# Patient Record
Sex: MALE | Race: WHITE | NOT HISPANIC OR LATINO
[De-identification: names, ages, dates, MRNs, and addresses within clinical notes are randomized per-mention and may not be internally consistent; named-entity substitution may affect disease eponyms.]

---

## 2017-02-28 ENCOUNTER — RX RENEWAL (OUTPATIENT)
Age: 67
End: 2017-02-28

## 2017-03-10 ENCOUNTER — APPOINTMENT (OUTPATIENT)
Dept: FAMILY MEDICINE | Facility: CLINIC | Age: 67
End: 2017-03-10

## 2017-03-10 VITALS
DIASTOLIC BLOOD PRESSURE: 70 MMHG | SYSTOLIC BLOOD PRESSURE: 120 MMHG | OXYGEN SATURATION: 98 % | HEART RATE: 60 BPM | BODY MASS INDEX: 31.84 KG/M2 | WEIGHT: 215 LBS | HEIGHT: 69 IN

## 2017-03-10 DIAGNOSIS — M54.5 LOW BACK PAIN: ICD-10-CM

## 2017-03-10 DIAGNOSIS — H04.203 UNSPECIFIED EPIPHORA, BILATERAL: ICD-10-CM

## 2017-03-10 DIAGNOSIS — G89.29 LOW BACK PAIN: ICD-10-CM

## 2017-09-12 ENCOUNTER — RX RENEWAL (OUTPATIENT)
Age: 67
End: 2017-09-12

## 2017-10-16 ENCOUNTER — APPOINTMENT (OUTPATIENT)
Dept: FAMILY MEDICINE | Facility: CLINIC | Age: 67
End: 2017-10-16
Payer: MEDICARE

## 2017-10-16 VITALS
WEIGHT: 215.25 LBS | BODY MASS INDEX: 31.88 KG/M2 | HEIGHT: 69 IN | SYSTOLIC BLOOD PRESSURE: 138 MMHG | DIASTOLIC BLOOD PRESSURE: 80 MMHG

## 2017-10-16 PROCEDURE — 99214 OFFICE O/P EST MOD 30 MIN: CPT

## 2017-12-01 ENCOUNTER — APPOINTMENT (OUTPATIENT)
Dept: FAMILY MEDICINE | Facility: CLINIC | Age: 67
End: 2017-12-01
Payer: MEDICARE

## 2017-12-01 VITALS
DIASTOLIC BLOOD PRESSURE: 62 MMHG | BODY MASS INDEX: 30.81 KG/M2 | OXYGEN SATURATION: 98 % | HEIGHT: 69 IN | TEMPERATURE: 98.6 F | SYSTOLIC BLOOD PRESSURE: 132 MMHG | WEIGHT: 208 LBS | HEART RATE: 68 BPM | RESPIRATION RATE: 16 BRPM

## 2017-12-01 PROCEDURE — 99213 OFFICE O/P EST LOW 20 MIN: CPT

## 2017-12-06 ENCOUNTER — RX RENEWAL (OUTPATIENT)
Age: 67
End: 2017-12-06

## 2018-06-19 ENCOUNTER — RX RENEWAL (OUTPATIENT)
Age: 68
End: 2018-06-19

## 2018-08-15 ENCOUNTER — RX RENEWAL (OUTPATIENT)
Age: 68
End: 2018-08-15

## 2018-09-17 ENCOUNTER — APPOINTMENT (OUTPATIENT)
Dept: FAMILY MEDICINE | Facility: CLINIC | Age: 68
End: 2018-09-17
Payer: MEDICARE

## 2018-09-17 VITALS
SYSTOLIC BLOOD PRESSURE: 122 MMHG | HEIGHT: 69 IN | BODY MASS INDEX: 31.4 KG/M2 | DIASTOLIC BLOOD PRESSURE: 70 MMHG | HEART RATE: 64 BPM | OXYGEN SATURATION: 96 % | TEMPERATURE: 98.3 F | WEIGHT: 212 LBS

## 2018-09-17 PROCEDURE — 99213 OFFICE O/P EST LOW 20 MIN: CPT

## 2018-09-18 ENCOUNTER — APPOINTMENT (OUTPATIENT)
Dept: FAMILY MEDICINE | Facility: CLINIC | Age: 68
End: 2018-09-18

## 2018-10-19 ENCOUNTER — APPOINTMENT (OUTPATIENT)
Dept: FAMILY MEDICINE | Facility: CLINIC | Age: 68
End: 2018-10-19
Payer: MEDICARE

## 2018-10-19 ENCOUNTER — NON-APPOINTMENT (OUTPATIENT)
Age: 68
End: 2018-10-19

## 2018-10-19 VITALS
BODY MASS INDEX: 30.4 KG/M2 | RESPIRATION RATE: 16 BRPM | OXYGEN SATURATION: 98 % | HEART RATE: 65 BPM | TEMPERATURE: 98.1 F | WEIGHT: 210 LBS | SYSTOLIC BLOOD PRESSURE: 120 MMHG | DIASTOLIC BLOOD PRESSURE: 70 MMHG | HEIGHT: 69.5 IN

## 2018-10-19 DIAGNOSIS — M70.20 OLECRANON BURSITIS, UNSPECIFIED ELBOW: ICD-10-CM

## 2018-10-19 PROCEDURE — 93000 ELECTROCARDIOGRAM COMPLETE: CPT | Mod: 59

## 2018-10-19 PROCEDURE — 36415 COLL VENOUS BLD VENIPUNCTURE: CPT

## 2018-10-19 PROCEDURE — G0438: CPT

## 2018-10-19 RX ORDER — FLUTICASONE PROPIONATE 50 UG/1
50 SPRAY, METERED NASAL DAILY
Qty: 16 | Refills: 0 | Status: DISCONTINUED | COMMUNITY
Start: 2017-12-01 | End: 2018-10-19

## 2018-10-19 NOTE — HEALTH RISK ASSESSMENT
[Very Good] : ~his/her~  mood as very good [] : No [No falls in past year] : Patient reported no falls in the past year [0] : 2) Feeling down, depressed, or hopeless: Not at all (0) [de-identified] : Cigars [de-identified] : Occasional [Patient reported colonoscopy was normal] : Patient reported colonoscopy was normal [Change in mental status noted] : No change in mental status noted [With Family] : lives with family [Fully functional (bathing, dressing, toileting, transferring, walking, feeding)] : Fully functional (bathing, dressing, toileting, transferring, walking, feeding) [Fully functional (using the telephone, shopping, preparing meals, housekeeping, doing laundry, using] : Fully functional and needs no help or supervision to perform IADLs (using the telephone, shopping, preparing meals, housekeeping, doing laundry, using transportation, managing medications and managing finances) [Seat Belt] :  uses seat belt [ColonoscopyDate] : 2015 [Aggressive treatment] : aggressive treatment

## 2018-10-19 NOTE — HISTORY OF PRESENT ILLNESS
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 No residual other than mild word finding difficulties.\par \par Small amounts of seroma remains right elbow. Patient will use Ace bandage.\par \par Needs followup colonoscopy.\par \par Stucco keratosis of hands and skin tag left flank reassured\par \par Status post prostate cancer , prostatectomy. No urinary difficulties

## 2018-10-19 NOTE — PHYSICAL EXAM
[No Acute Distress] : no acute distress [Well-Appearing] : well-appearing [Clear to Auscultation] : lungs were clear to auscultation bilaterally [Regular Rhythm] : with a regular rhythm [No Murmur] : no murmur heard [No Carotid Bruits] : no carotid bruits [Pedal Pulses Present] : the pedal pulses are present [No Edema] : there was no peripheral edema [Soft] : abdomen soft [Normal Anterior Cervical Nodes] : no anterior cervical lymphadenopathy [de-identified] : Pre-olecranon seroma right side

## 2018-10-21 LAB
ALBUMIN SERPL ELPH-MCNC: 4.6 G/DL
ALP BLD-CCNC: 77 U/L
ALT SERPL-CCNC: 21 U/L
ANION GAP SERPL CALC-SCNC: 13 MMOL/L
AST SERPL-CCNC: 19 U/L
BASOPHILS # BLD AUTO: 0.01 K/UL
BASOPHILS NFR BLD AUTO: 0.2 %
BILIRUB SERPL-MCNC: 0.4 MG/DL
BUN SERPL-MCNC: 20 MG/DL
CALCIUM SERPL-MCNC: 9.9 MG/DL
CHLORIDE SERPL-SCNC: 108 MMOL/L
CHOLEST SERPL-MCNC: 154 MG/DL
CHOLEST/HDLC SERPL: 2.6 RATIO
CO2 SERPL-SCNC: 26 MMOL/L
CREAT SERPL-MCNC: 0.88 MG/DL
EOSINOPHIL # BLD AUTO: 0.1 K/UL
EOSINOPHIL NFR BLD AUTO: 1.8 %
GLUCOSE SERPL-MCNC: 99 MG/DL
HBA1C MFR BLD HPLC: 5.6 %
HCT VFR BLD CALC: 45.3 %
HDLC SERPL-MCNC: 60 MG/DL
HGB BLD-MCNC: 15.3 G/DL
IMM GRANULOCYTES NFR BLD AUTO: 0.2 %
LDLC SERPL CALC-MCNC: 80 MG/DL
LYMPHOCYTES # BLD AUTO: 2.38 K/UL
LYMPHOCYTES NFR BLD AUTO: 44 %
MAN DIFF?: NORMAL
MCHC RBC-ENTMCNC: 30.2 PG
MCHC RBC-ENTMCNC: 33.8 GM/DL
MCV RBC AUTO: 89.3 FL
MONOCYTES # BLD AUTO: 0.37 K/UL
MONOCYTES NFR BLD AUTO: 6.8 %
NEUTROPHILS # BLD AUTO: 2.54 K/UL
NEUTROPHILS NFR BLD AUTO: 47 %
PLATELET # BLD AUTO: 174 K/UL
POTASSIUM SERPL-SCNC: 5.3 MMOL/L
PROT SERPL-MCNC: 7.3 G/DL
PSA SERPL-MCNC: 0.18 NG/ML
RBC # BLD: 5.07 M/UL
RBC # FLD: 14.1 %
SODIUM SERPL-SCNC: 147 MMOL/L
TRIGL SERPL-MCNC: 71 MG/DL
WBC # FLD AUTO: 5.41 K/UL

## 2018-11-13 ENCOUNTER — APPOINTMENT (OUTPATIENT)
Dept: FAMILY MEDICINE | Facility: CLINIC | Age: 68
End: 2018-11-13
Payer: MEDICARE

## 2018-11-13 VITALS
HEIGHT: 69.5 IN | HEART RATE: 68 BPM | SYSTOLIC BLOOD PRESSURE: 102 MMHG | WEIGHT: 220 LBS | OXYGEN SATURATION: 98 % | BODY MASS INDEX: 31.85 KG/M2 | DIASTOLIC BLOOD PRESSURE: 74 MMHG

## 2018-11-13 DIAGNOSIS — G89.29 DORSALGIA, UNSPECIFIED: ICD-10-CM

## 2018-11-13 DIAGNOSIS — M54.9 DORSALGIA, UNSPECIFIED: ICD-10-CM

## 2018-11-13 PROCEDURE — 99213 OFFICE O/P EST LOW 20 MIN: CPT

## 2019-01-23 ENCOUNTER — RX RENEWAL (OUTPATIENT)
Age: 69
End: 2019-01-23

## 2019-05-13 ENCOUNTER — RX RENEWAL (OUTPATIENT)
Age: 69
End: 2019-05-13

## 2019-06-26 ENCOUNTER — APPOINTMENT (OUTPATIENT)
Dept: FAMILY MEDICINE | Facility: CLINIC | Age: 69
End: 2019-06-26
Payer: MEDICARE

## 2019-06-26 VITALS
WEIGHT: 216 LBS | BODY MASS INDEX: 31.27 KG/M2 | HEIGHT: 69.5 IN | HEART RATE: 57 BPM | OXYGEN SATURATION: 97 % | DIASTOLIC BLOOD PRESSURE: 66 MMHG | SYSTOLIC BLOOD PRESSURE: 126 MMHG

## 2019-06-26 PROCEDURE — G0444 DEPRESSION SCREEN ANNUAL: CPT | Mod: 59

## 2019-06-26 PROCEDURE — 99214 OFFICE O/P EST MOD 30 MIN: CPT | Mod: 25

## 2019-06-26 NOTE — ASSESSMENT
[FreeTextEntry1] : Tiny eschar right lower leg no spreading erythema no rash elsewhere not ill will observe if rash spreads consider Doxy\par \par Referral to physical therapy for shoulder pain

## 2019-06-26 NOTE — HISTORY OF PRESENT ILLNESS
[FreeTextEntry8] : Slightly pruritic papule right lower leg noted approximately 1 week ago.  Small scab with tiny area of surrounding erythema.  Patient does not recall removing any ticks.  Not ill\par \par Chronic right shoulder pain request physical therapy.  Rotator cuff maneuvers negative.  Patient had left shoulder arthroscopy in the past

## 2019-07-19 DIAGNOSIS — M25.512 PAIN IN LEFT SHOULDER: ICD-10-CM

## 2019-08-10 ENCOUNTER — RX RENEWAL (OUTPATIENT)
Age: 69
End: 2019-08-10

## 2019-08-26 ENCOUNTER — APPOINTMENT (OUTPATIENT)
Dept: FAMILY MEDICINE | Facility: CLINIC | Age: 69
End: 2019-08-26
Payer: MEDICARE

## 2019-08-26 VITALS
DIASTOLIC BLOOD PRESSURE: 76 MMHG | BODY MASS INDEX: 31.27 KG/M2 | WEIGHT: 216 LBS | HEART RATE: 82 BPM | HEIGHT: 69.5 IN | OXYGEN SATURATION: 98 % | TEMPERATURE: 97.9 F | SYSTOLIC BLOOD PRESSURE: 118 MMHG

## 2019-08-26 PROCEDURE — 99213 OFFICE O/P EST LOW 20 MIN: CPT

## 2019-08-26 NOTE — HISTORY OF PRESENT ILLNESS
[FreeTextEntry8] : diffuse pruritic rash in woods also in a hottub states clean no fever otherwise well

## 2019-09-26 ENCOUNTER — RX RENEWAL (OUTPATIENT)
Age: 69
End: 2019-09-26

## 2019-12-24 ENCOUNTER — APPOINTMENT (OUTPATIENT)
Dept: FAMILY MEDICINE | Facility: CLINIC | Age: 69
End: 2019-12-24
Payer: MEDICARE

## 2019-12-24 ENCOUNTER — LABORATORY RESULT (OUTPATIENT)
Age: 69
End: 2019-12-24

## 2019-12-24 VITALS
HEIGHT: 69 IN | BODY MASS INDEX: 31.4 KG/M2 | HEART RATE: 73 BPM | WEIGHT: 212 LBS | RESPIRATION RATE: 16 BRPM | SYSTOLIC BLOOD PRESSURE: 124 MMHG | OXYGEN SATURATION: 97 % | DIASTOLIC BLOOD PRESSURE: 76 MMHG

## 2019-12-24 PROCEDURE — 99214 OFFICE O/P EST MOD 30 MIN: CPT

## 2019-12-30 LAB
25(OH)D3 SERPL-MCNC: 39.2 NG/ML
ALBUMIN SERPL ELPH-MCNC: 4.3 G/DL
ALP BLD-CCNC: 87 U/L
ALT SERPL-CCNC: 21 U/L
ANAPLASMA PHAGOCYTO IGM COMENT: NORMAL
ANAPLASMA PHAGOCYTO IGM COMMENT: NORMAL
ANAPLASMA PHAGOCYTOPHILIA IGG ANTIBODIES: NORMAL
ANAPLASMA PHAGOCYTOPHILIA IGM ANTIBODIES: NORMAL
ANION GAP SERPL CALC-SCNC: 14 MMOL/L
AST SERPL-CCNC: 18 U/L
B BURGDOR IGG+IGM SER QL IB: NORMAL
B MICROTI AB SER QL: NORMAL
BABESIA ANTIBODIES, IGG: NORMAL
BABESIA ANTIBODIES, IGM: NORMAL
BASOPHILS # BLD AUTO: 0.02 K/UL
BASOPHILS NFR BLD AUTO: 0.5 %
BILIRUB SERPL-MCNC: 0.5 MG/DL
BUN SERPL-MCNC: 13 MG/DL
CALCIUM SERPL-MCNC: 10 MG/DL
CHLORIDE SERPL-SCNC: 106 MMOL/L
CHOLEST SERPL-MCNC: 147 MG/DL
CHOLEST/HDLC SERPL: 2.5 RATIO
CO2 SERPL-SCNC: 24 MMOL/L
CREAT SERPL-MCNC: 0.91 MG/DL
EHRLICIA CHAFFEENIS IGG ANTIBODIES: NORMAL
EHRLICIA CHAFFEENIS IGG COMMENT: NORMAL
EHRLICIA CHAFFEENIS IGG INTERP: NORMAL
EHRLICIA CHAFFEENIS IGM ANTIBODIES: NORMAL
EOSINOPHIL # BLD AUTO: 0.09 K/UL
EOSINOPHIL NFR BLD AUTO: 2 %
ESTIMATED AVERAGE GLUCOSE: 111 MG/DL
GLUCOSE SERPL-MCNC: 112 MG/DL
HBA1C MFR BLD HPLC: 5.5 %
HCT VFR BLD CALC: 45.4 %
HDLC SERPL-MCNC: 59 MG/DL
HGB BLD-MCNC: 15.2 G/DL
IMM GRANULOCYTES NFR BLD AUTO: 0.2 %
LDLC SERPL CALC-MCNC: 73 MG/DL
LYMPHOCYTES # BLD AUTO: 1.73 K/UL
LYMPHOCYTES NFR BLD AUTO: 39 %
MAN DIFF?: NORMAL
MCHC RBC-ENTMCNC: 29.6 PG
MCHC RBC-ENTMCNC: 33.5 GM/DL
MCV RBC AUTO: 88.5 FL
MONOCYTES # BLD AUTO: 0.33 K/UL
MONOCYTES NFR BLD AUTO: 7.4 %
NEUTROPHILS # BLD AUTO: 2.26 K/UL
NEUTROPHILS NFR BLD AUTO: 50.9 %
PLATELET # BLD AUTO: 167 K/UL
POTASSIUM SERPL-SCNC: 5.2 MMOL/L
PROT SERPL-MCNC: 6.9 G/DL
RBC # BLD: 5.13 M/UL
RBC # FLD: 13.1 %
SODIUM SERPL-SCNC: 144 MMOL/L
T4 SERPL-MCNC: 9.3 UG/DL
TRIGL SERPL-MCNC: 76 MG/DL
TSH SERPL-ACNC: 0.94 UIU/ML
URATE SERPL-MCNC: 6.3 MG/DL
WBC # FLD AUTO: 4.44 K/UL

## 2020-01-22 ENCOUNTER — RX RENEWAL (OUTPATIENT)
Age: 70
End: 2020-01-22

## 2020-02-15 ENCOUNTER — RX RENEWAL (OUTPATIENT)
Age: 70
End: 2020-02-15

## 2020-08-03 ENCOUNTER — APPOINTMENT (OUTPATIENT)
Dept: FAMILY MEDICINE | Facility: CLINIC | Age: 70
End: 2020-08-03
Payer: MEDICARE

## 2020-08-03 VITALS
WEIGHT: 207 LBS | BODY MASS INDEX: 30.66 KG/M2 | HEIGHT: 69 IN | DIASTOLIC BLOOD PRESSURE: 74 MMHG | TEMPERATURE: 98.1 F | SYSTOLIC BLOOD PRESSURE: 120 MMHG | OXYGEN SATURATION: 96 % | RESPIRATION RATE: 16 BRPM | HEART RATE: 60 BPM

## 2020-08-03 PROCEDURE — 99214 OFFICE O/P EST MOD 30 MIN: CPT | Mod: 25

## 2020-08-03 PROCEDURE — 96372 THER/PROPH/DIAG INJ SC/IM: CPT

## 2020-08-03 RX ORDER — PREDNISONE 10 MG/1
10 TABLET ORAL DAILY
Qty: 30 | Refills: 0 | Status: DISCONTINUED | COMMUNITY
Start: 2019-08-26 | End: 2020-08-03

## 2020-08-03 RX ORDER — METHYLPRED ACET/NACL,ISO-OS/PF 40 MG/ML
40 VIAL (ML) INJECTION
Qty: 1 | Refills: 0 | Status: COMPLETED | OUTPATIENT
Start: 2020-08-03

## 2020-08-03 RX ORDER — METHYLPREDNISOLONE 4 MG/1
4 TABLET ORAL
Qty: 1 | Refills: 0 | Status: DISCONTINUED | COMMUNITY
Start: 2018-11-13 | End: 2020-08-03

## 2020-08-03 RX ORDER — HYDROCODONE BITARTRATE AND ACETAMINOPHEN 5; 325 MG/1; MG/1
5-325 TABLET ORAL
Qty: 20 | Refills: 0 | Status: DISCONTINUED | COMMUNITY
Start: 2018-11-13 | End: 2020-08-03

## 2020-08-03 RX ADMIN — METHYLPREDNISOLONE ACETATE 0 MG/ML: 40 INJECTION, SUSPENSION INTRA-ARTICULAR; INTRALESIONAL; INTRAMUSCULAR; SOFT TISSUE at 00:00

## 2020-08-03 NOTE — HISTORY OF PRESENT ILLNESS
[FreeTextEntry8] : Pruritic erythematous papules consistent with chigger bites for the past 4 days after clamming.  Patient is not ill.  No sign of infection.  Pruritus is intense

## 2020-09-10 ENCOUNTER — RX RENEWAL (OUTPATIENT)
Age: 70
End: 2020-09-10

## 2020-10-09 ENCOUNTER — RX RENEWAL (OUTPATIENT)
Age: 70
End: 2020-10-09

## 2020-10-29 ENCOUNTER — NON-APPOINTMENT (OUTPATIENT)
Age: 70
End: 2020-10-29

## 2020-10-29 ENCOUNTER — APPOINTMENT (OUTPATIENT)
Dept: FAMILY MEDICINE | Facility: CLINIC | Age: 70
End: 2020-10-29
Payer: MEDICARE

## 2020-10-29 ENCOUNTER — LABORATORY RESULT (OUTPATIENT)
Age: 70
End: 2020-10-29

## 2020-10-29 VITALS
TEMPERATURE: 97.8 F | HEIGHT: 69 IN | DIASTOLIC BLOOD PRESSURE: 64 MMHG | BODY MASS INDEX: 31.1 KG/M2 | SYSTOLIC BLOOD PRESSURE: 122 MMHG | WEIGHT: 210 LBS | HEART RATE: 62 BPM | OXYGEN SATURATION: 94 %

## 2020-10-29 DIAGNOSIS — H26.9 UNSPECIFIED CATARACT: ICD-10-CM
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
## 2020-10-29 DIAGNOSIS — Z87.39 PERSONAL HISTORY OF OTHER DISEASES OF THE MUSCULOSKELETAL SYSTEM AND CONNECTIVE TISSUE: ICD-10-CM

## 2020-10-29 DIAGNOSIS — R93.3 ABNORMAL FINDINGS ON DIAGNOSTIC IMAGING OF OTHER PARTS OF DIGESTIVE TRACT: ICD-10-CM

## 2020-10-29 DIAGNOSIS — Z87.2 PERSONAL HISTORY OF DISEASES OF THE SKIN AND SUBCUTANEOUS TISSUE: ICD-10-CM
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
## 2020-10-29 DIAGNOSIS — S70.361A INSECT BITE (NONVENOMOUS), RIGHT THIGH, INITIAL ENCOUNTER: ICD-10-CM

## 2020-10-29 DIAGNOSIS — R13.10 DYSPHAGIA, UNSPECIFIED: ICD-10-CM
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
## 2020-10-29 DIAGNOSIS — Z87.828 PERSONAL HISTORY OF OTHER (HEALED) PHYSICAL INJURY AND TRAUMA: ICD-10-CM

## 2020-10-29 DIAGNOSIS — S80.861A INSECT BITE (NONVENOMOUS), RIGHT LOWER LEG, INITIAL ENCOUNTER: ICD-10-CM

## 2020-10-29 DIAGNOSIS — J06.9 ACUTE UPPER RESPIRATORY INFECTION, UNSPECIFIED: ICD-10-CM

## 2020-10-29 PROCEDURE — 93000 ELECTROCARDIOGRAM COMPLETE: CPT | Mod: 59

## 2020-10-29 PROCEDURE — 99214 OFFICE O/P EST MOD 30 MIN: CPT | Mod: 25

## 2020-10-29 RX ORDER — PREDNISONE 10 MG/1
10 TABLET ORAL 3 TIMES DAILY
Qty: 15 | Refills: 1 | Status: DISCONTINUED | COMMUNITY
Start: 2020-08-03 | End: 2020-10-29

## 2020-10-29 NOTE — ASSESSMENT
[Patient Optimized for Surgery] : Patient optimized for surgery [FreeTextEntry4] : Patient is cleared for lens replacement for both eyes

## 2020-10-30 LAB
ALBUMIN SERPL ELPH-MCNC: 4.2 G/DL
ALP BLD-CCNC: 76 U/L
ALT SERPL-CCNC: 18 U/L
ANION GAP SERPL CALC-SCNC: 11 MMOL/L
AST SERPL-CCNC: 16 U/L
B BURGDOR IGG+IGM SER QL IB: NORMAL
BILIRUB SERPL-MCNC: 0.4 MG/DL
BUN SERPL-MCNC: 21 MG/DL
CALCIUM SERPL-MCNC: 9.2 MG/DL
CHLORIDE SERPL-SCNC: 103 MMOL/L
CO2 SERPL-SCNC: 25 MMOL/L
CREAT SERPL-MCNC: 0.88 MG/DL
GLUCOSE SERPL-MCNC: 105 MG/DL
POTASSIUM SERPL-SCNC: 4.6 MMOL/L
PROT SERPL-MCNC: 6.6 G/DL
PSA SERPL-MCNC: 0.34 NG/ML
SODIUM SERPL-SCNC: 140 MMOL/L

## 2020-11-09 ENCOUNTER — APPOINTMENT (OUTPATIENT)
Dept: FAMILY MEDICINE | Facility: CLINIC | Age: 70
End: 2020-11-09

## 2021-03-11 ENCOUNTER — RX RENEWAL (OUTPATIENT)
Age: 71
End: 2021-03-11

## 2021-08-12 ENCOUNTER — RX RENEWAL (OUTPATIENT)
Age: 71
End: 2021-08-12

## 2021-08-20 ENCOUNTER — APPOINTMENT (OUTPATIENT)
Dept: FAMILY MEDICINE | Facility: CLINIC | Age: 71
End: 2021-08-20
Payer: MEDICARE

## 2021-08-20 VITALS
HEIGHT: 69 IN | WEIGHT: 210 LBS | SYSTOLIC BLOOD PRESSURE: 100 MMHG | BODY MASS INDEX: 31.1 KG/M2 | OXYGEN SATURATION: 98 % | HEART RATE: 61 BPM | TEMPERATURE: 97.3 F | RESPIRATION RATE: 16 BRPM | DIASTOLIC BLOOD PRESSURE: 70 MMHG
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
## 2021-08-20 DIAGNOSIS — B88.0 OTHER ACARIASIS: ICD-10-CM

## 2021-08-20 PROCEDURE — 99213 OFFICE O/P EST LOW 20 MIN: CPT | Mod: 25

## 2021-08-20 PROCEDURE — 96372 THER/PROPH/DIAG INJ SC/IM: CPT

## 2021-08-20 RX ORDER — METHYLPRED ACET/NACL,ISO-OS/PF 40 MG/ML
40 VIAL (ML) INJECTION
Qty: 1 | Refills: 0 | Status: COMPLETED | OUTPATIENT
Start: 2021-08-20

## 2021-08-20 RX ADMIN — METHYLPREDNISOLONE ACETATE 0 MG/ML: 40 INJECTION, SUSPENSION INTRA-ARTICULAR; INTRALESIONAL; INTRAMUSCULAR; SOFT TISSUE at 00:00

## 2021-08-20 NOTE — PHYSICAL EXAM
[No Acute Distress] : no acute distress [Well Nourished] : well nourished [Well Developed] : well developed [No Respiratory Distress] : no respiratory distress  [No Accessory Muscle Use] : no accessory muscle use [Clear to Auscultation] : lungs were clear to auscultation bilaterally [Normal Rate] : normal rate  [Regular Rhythm] : with a regular rhythm [Normal S1, S2] : normal S1 and S2 [de-identified] : multiple 2 mm x 2 mm bug bites erythematous with clear exudates on b/l UEs, back, b/l ankle regions and feet

## 2021-08-20 NOTE — PLAN
[FreeTextEntry1] : Chigger bites\par - medrol inj in L arm, pt tolerated well\par - prednisone TID to start tomorrow for 3 days\par - triamcinolone cream PRN \par - advised to avoid itching and stay hydrated

## 2021-08-20 NOTE — REVIEW OF SYSTEMS
[Fever] : no fever [Chills] : no chills [Fatigue] : no fatigue [Pain] : no pain [Redness] : no redness [Chest Pain] : no chest pain [Palpitations] : no palpitations [Shortness Of Breath] : no shortness of breath [Wheezing] : no wheezing [Cough] : no cough [Abdominal Pain] : no abdominal pain [Nausea] : no nausea [Constipation] : no constipation [Vomiting] : no vomiting [de-identified] : multiple erythematous bites on b/l UEs, back, b/l LE ankle regions

## 2021-08-20 NOTE — HISTORY OF PRESENT ILLNESS
[FreeTextEntry8] : 72 YO M here for bug bites. Pt thinks he has chigger bites. Pt states he walks his dogs everyday on the roads. Pt states since day before yesterday he has very itchy, some are leaking fluid. Pt put on cortisone ream which helped a little. Benadryl did not help at all. Pt denies fever, chills, CP, SOB.

## 2021-09-01 ENCOUNTER — APPOINTMENT (OUTPATIENT)
Dept: FAMILY MEDICINE | Facility: CLINIC | Age: 71
End: 2021-09-01
Payer: MEDICARE

## 2021-09-01 VITALS
SYSTOLIC BLOOD PRESSURE: 118 MMHG | WEIGHT: 209 LBS | HEIGHT: 69 IN | BODY MASS INDEX: 30.96 KG/M2 | OXYGEN SATURATION: 97 % | DIASTOLIC BLOOD PRESSURE: 80 MMHG | TEMPERATURE: 97.3 F | HEART RATE: 58 BPM

## 2021-09-01 PROCEDURE — 99214 OFFICE O/P EST MOD 30 MIN: CPT

## 2021-09-01 RX ORDER — PREDNISONE 10 MG/1
10 TABLET ORAL 3 TIMES DAILY
Qty: 9 | Refills: 0 | Status: DISCONTINUED | COMMUNITY
Start: 2021-08-20 | End: 2021-09-01

## 2021-09-01 NOTE — HISTORY OF PRESENT ILLNESS
[FreeTextEntry1] : Here for med renewal\par \par  [de-identified] : Has no complaints exercises regularly no cardiovascular symptoms\par \par Prostate cancer in the distant past PSA has been normal\par \par Hypertension and hyperlipidemia controlled with current meds

## 2021-09-02 LAB
ALBUMIN SERPL ELPH-MCNC: 4.7 G/DL
ALP BLD-CCNC: 79 U/L
ALT SERPL-CCNC: 23 U/L
ANION GAP SERPL CALC-SCNC: 14 MMOL/L
AST SERPL-CCNC: 21 U/L
BILIRUB SERPL-MCNC: 0.4 MG/DL
BUN SERPL-MCNC: 22 MG/DL
CALCIUM SERPL-MCNC: 9.7 MG/DL
CHLORIDE SERPL-SCNC: 105 MMOL/L
CHOLEST SERPL-MCNC: 184 MG/DL
CO2 SERPL-SCNC: 24 MMOL/L
CREAT SERPL-MCNC: 0.82 MG/DL
ESTIMATED AVERAGE GLUCOSE: 120 MG/DL
GLUCOSE SERPL-MCNC: 91 MG/DL
HBA1C MFR BLD HPLC: 5.8 %
HDLC SERPL-MCNC: 62 MG/DL
LDLC SERPL CALC-MCNC: 96 MG/DL
NONHDLC SERPL-MCNC: 123 MG/DL
POTASSIUM SERPL-SCNC: 4.7 MMOL/L
PROT SERPL-MCNC: 7.2 G/DL
PSA SERPL-MCNC: 0.47 NG/ML
SODIUM SERPL-SCNC: 143 MMOL/L
TRIGL SERPL-MCNC: 133 MG/DL

## 2021-10-01 ENCOUNTER — NON-APPOINTMENT (OUTPATIENT)
Age: 71
End: 2021-10-01

## 2021-10-01 ENCOUNTER — APPOINTMENT (OUTPATIENT)
Dept: FAMILY MEDICINE | Facility: CLINIC | Age: 71
End: 2021-10-01
Payer: MEDICARE

## 2021-10-01 VITALS
OXYGEN SATURATION: 95 % | RESPIRATION RATE: 16 BRPM | DIASTOLIC BLOOD PRESSURE: 74 MMHG | BODY MASS INDEX: 31.99 KG/M2 | HEART RATE: 57 BPM | HEIGHT: 69 IN | TEMPERATURE: 96.6 F | SYSTOLIC BLOOD PRESSURE: 128 MMHG | WEIGHT: 216 LBS

## 2021-10-01 DIAGNOSIS — Z01.818 ENCOUNTER FOR OTHER PREPROCEDURAL EXAMINATION: ICD-10-CM

## 2021-10-01 DIAGNOSIS — Z12.11 ENCOUNTER FOR SCREENING FOR MALIGNANT NEOPLASM OF COLON: ICD-10-CM

## 2021-10-01 PROCEDURE — 99214 OFFICE O/P EST MOD 30 MIN: CPT | Mod: 25

## 2021-10-01 PROCEDURE — 93000 ELECTROCARDIOGRAM COMPLETE: CPT

## 2021-10-01 NOTE — HISTORY OF PRESENT ILLNESS
[No Pertinent Cardiac History] : no history of aortic stenosis, atrial fibrillation, coronary artery disease, recent myocardial infarction, or implantable device/pacemaker [No Pertinent Pulmonary History] : no history of asthma, COPD, sleep apnea, or smoking [Chronic Anticoagulation] : no chronic anticoagulation [Chronic Kidney Disease] : no chronic kidney disease [Diabetes] : diabetes [(Patient denies any chest pain, claudication, dyspnea on exertion, orthopnea, palpitations or syncope)] : Patient denies any chest pain, claudication, dyspnea on exertion, orthopnea, palpitations or syncope [FreeTextEntry1] : Follow-up colonoscopy [FreeTextEntry2] : To be determined [FreeTextEntry3] : Henry [FreeTextEntry4] : Patient having surveillance colonoscopy for strong family history of colon cancer\par \par He remains active without cardiovascular symptoms

## 2021-10-25 ENCOUNTER — APPOINTMENT (OUTPATIENT)
Dept: FAMILY MEDICINE | Facility: CLINIC | Age: 71
End: 2021-10-25
Payer: MEDICARE

## 2021-10-25 VITALS
HEIGHT: 69 IN | HEART RATE: 75 BPM | TEMPERATURE: 97.5 F | OXYGEN SATURATION: 97 % | BODY MASS INDEX: 32.14 KG/M2 | SYSTOLIC BLOOD PRESSURE: 140 MMHG | WEIGHT: 217 LBS | DIASTOLIC BLOOD PRESSURE: 80 MMHG | RESPIRATION RATE: 16 BRPM

## 2021-10-25 DIAGNOSIS — G47.62 SLEEP RELATED LEG CRAMPS: ICD-10-CM

## 2021-10-25 PROCEDURE — 99214 OFFICE O/P EST MOD 30 MIN: CPT

## 2021-10-25 NOTE — HISTORY OF PRESENT ILLNESS
[FreeTextEntry8] : Classic nocturnal leg cramps.  Cramps are relieved with banana and or tonic water.  Sent by specialist for electrolytes and magnesium level.  Denies claudication.  Patient also gets carpopedal spasm and has been taking potassium daily up until 1 week ago\par \par Physical exam pulses are excellent light touch normal vibratory sense normal 0 Achilles and 1+ patella deep tendon reflexes bilaterally\par \par Nocturnal leg cramps patient will use bananas and quinine check labs as requested

## 2021-10-27 LAB
ALBUMIN SERPL ELPH-MCNC: 4.4 G/DL
ANION GAP SERPL CALC-SCNC: 13 MMOL/L
BUN SERPL-MCNC: 22 MG/DL
CALCIUM SERPL-MCNC: 9.7 MG/DL
CHLORIDE SERPL-SCNC: 105 MMOL/L
CO2 SERPL-SCNC: 24 MMOL/L
CREAT SERPL-MCNC: 0.81 MG/DL
GLUCOSE SERPL-MCNC: 106 MG/DL
MAGNESIUM SERPL-MCNC: 2 MG/DL
PHOSPHATE SERPL-MCNC: 2.4 MG/DL
POTASSIUM SERPL-SCNC: 4.5 MMOL/L
SODIUM SERPL-SCNC: 142 MMOL/L
TSH SERPL-ACNC: 1.07 UIU/ML

## 2021-11-16 ENCOUNTER — APPOINTMENT (OUTPATIENT)
Dept: FAMILY MEDICINE | Facility: CLINIC | Age: 71
End: 2021-11-16
Payer: MEDICARE

## 2021-11-16 VITALS
SYSTOLIC BLOOD PRESSURE: 128 MMHG | WEIGHT: 213 LBS | HEART RATE: 67 BPM | BODY MASS INDEX: 31.55 KG/M2 | DIASTOLIC BLOOD PRESSURE: 80 MMHG | HEIGHT: 69 IN | TEMPERATURE: 97.4 F | OXYGEN SATURATION: 95 %

## 2021-11-16 DIAGNOSIS — M54.50 LOW BACK PAIN, UNSPECIFIED: ICD-10-CM

## 2021-11-16 PROCEDURE — 99213 OFFICE O/P EST LOW 20 MIN: CPT

## 2021-11-16 NOTE — PHYSICAL EXAM
[Normal] : no acute distress, well nourished, well developed and well-appearing [de-identified] : pain and decreased rom of ls spine

## 2021-11-16 NOTE — HISTORY OF PRESENT ILLNESS
[FreeTextEntry8] : exacerbation of lumbar pain radiates to right leg no specific mechanism of injury pt helpful in the past

## 2021-12-29 ENCOUNTER — RX RENEWAL (OUTPATIENT)
Age: 71
End: 2021-12-29

## 2022-04-28 ENCOUNTER — APPOINTMENT (OUTPATIENT)
Dept: FAMILY MEDICINE | Facility: CLINIC | Age: 72
End: 2022-04-28
Payer: MEDICARE

## 2022-04-28 VITALS
OXYGEN SATURATION: 95 % | DIASTOLIC BLOOD PRESSURE: 70 MMHG | HEIGHT: 69 IN | BODY MASS INDEX: 32.73 KG/M2 | SYSTOLIC BLOOD PRESSURE: 120 MMHG | HEART RATE: 65 BPM | WEIGHT: 221 LBS | TEMPERATURE: 97.3 F

## 2022-04-28 DIAGNOSIS — M54.31 SCIATICA, RIGHT SIDE: ICD-10-CM

## 2022-04-28 DIAGNOSIS — M79.651 PAIN IN RIGHT THIGH: ICD-10-CM

## 2022-04-28 PROCEDURE — 99214 OFFICE O/P EST MOD 30 MIN: CPT

## 2022-04-28 NOTE — HISTORY OF PRESENT ILLNESS
[FreeTextEntry8] : 2 days of intermittent positional right groin and anterior thigh pain.  2 days ago was very painful when he stepped out of bed.  Since then he has subsided.  Lifted weights today without problem.  History of low back pain\par \par No pain with flexion and internal rotation of hip deep tendon reflexes are 0-1+ symmetrical motor exam is normal\par \par Probable mild L3 sciatica.  Physical therapy ordered follow-up as needed

## 2022-05-31 ENCOUNTER — RX RENEWAL (OUTPATIENT)
Age: 72
End: 2022-05-31

## 2022-06-03 ENCOUNTER — APPOINTMENT (OUTPATIENT)
Dept: FAMILY MEDICINE | Facility: CLINIC | Age: 72
End: 2022-06-03

## 2022-06-20 ENCOUNTER — APPOINTMENT (OUTPATIENT)
Dept: FAMILY MEDICINE | Facility: CLINIC | Age: 72
End: 2022-06-20
Payer: MEDICARE

## 2022-06-20 ENCOUNTER — LABORATORY RESULT (OUTPATIENT)
Age: 72
End: 2022-06-20

## 2022-06-20 VITALS
DIASTOLIC BLOOD PRESSURE: 68 MMHG | WEIGHT: 216 LBS | HEART RATE: 65 BPM | OXYGEN SATURATION: 97 % | SYSTOLIC BLOOD PRESSURE: 123 MMHG | BODY MASS INDEX: 31.99 KG/M2 | HEIGHT: 69 IN | TEMPERATURE: 96.9 F

## 2022-06-20 PROCEDURE — 99213 OFFICE O/P EST LOW 20 MIN: CPT

## 2022-06-20 NOTE — PHYSICAL EXAM
[Normal] : normal sclera/conjunctiva, pupils are equal, round and reactive to light and extraocular movements are intact [de-identified] : swelling and erethema around site of tick removal

## 2022-06-20 NOTE — HISTORY OF PRESENT ILLNESS
[FreeTextEntry8] : tick bite of back 2 weeks ago area inflamed no fever or joint pain has chronic back pain

## 2022-09-09 ENCOUNTER — RX RENEWAL (OUTPATIENT)
Age: 72
End: 2022-09-09

## 2022-11-02 ENCOUNTER — OFFICE (OUTPATIENT)
Dept: URBAN - METROPOLITAN AREA CLINIC 12 | Facility: CLINIC | Age: 72
Setting detail: OPHTHALMOLOGY
End: 2022-11-02
Payer: MEDICARE

## 2022-11-02 DIAGNOSIS — Z20.822: ICD-10-CM

## 2022-11-02 DIAGNOSIS — Z01.812: ICD-10-CM

## 2022-11-02 DIAGNOSIS — H26.493: ICD-10-CM

## 2022-11-02 PROCEDURE — 99024 POSTOP FOLLOW-UP VISIT: CPT | Performed by: OPTOMETRIST

## 2022-11-02 PROCEDURE — 99211 OFF/OP EST MAY X REQ PHY/QHP: CPT | Performed by: OPHTHALMOLOGY

## 2022-11-02 ASSESSMENT — SPHEQUIV_DERIVED
OS_SPHEQUIV: 0.375
OD_SPHEQUIV: 0.375
OD_SPHEQUIV: 0.375
OS_SPHEQUIV: 0.375
OD_SPHEQUIV: 0.25
OD_SPHEQUIV: 0.25

## 2022-11-02 ASSESSMENT — REFRACTION_MANIFEST
OS_SPHERE: +0.50
OD_SPHERE: +0.75
OD_SPHERE: +0.75
OS_AXIS: 52
OD_CYLINDER: -0.75
OD_AXIS: 105
OS_CYLINDER: -0.25
OS_CYLINDER: -0.25
OS_SPHERE: +0.50
OD_AXIS: 105
OD_CYLINDER: -0.75
OS_AXIS: 52

## 2022-11-02 ASSESSMENT — KERATOMETRY
OD_AXISANGLE_DEGREES: 76
OS_K1POWER_DIOPTERS: 41.25
OS_K2POWER_DIOPTERS: 41.75
OD_K2POWER_DIOPTERS: 42.25
OS_K2POWER_DIOPTERS: 41.75
OD_K1POWER_DIOPTERS: 41.25
OD_K2POWER_DIOPTERS: 42.25
OS_AXISANGLE_DEGREES: 90
OS_K1POWER_DIOPTERS: 41.25
OS_AXISANGLE_DEGREES: 90
OD_AXISANGLE_DEGREES: 76
OD_K1POWER_DIOPTERS: 41.25

## 2022-11-02 ASSESSMENT — REFRACTION_AUTOREFRACTION
OD_SPHERE: +0.50
OS_CYLINDER: SPHERE
OS_AXIS: 0
OD_CYLINDER: -0.50
OD_AXIS: 133
OS_SPHERE: +0.25
OS_CYLINDER: SPHERE
OD_AXIS: 133
OS_AXIS: 0
OD_SPHERE: +0.50
OD_CYLINDER: -0.50
OS_SPHERE: +0.25

## 2022-11-02 ASSESSMENT — LID EXAM ASSESSMENTS
OS_BLEPHARITIS: 1+
OD_BLEPHARITIS: 1+

## 2022-11-02 ASSESSMENT — VISUAL ACUITY
OD_BCVA: 20/20
OS_BCVA: 20/50
OD_BCVA: 20/20
OS_BCVA: 20/50

## 2022-11-02 ASSESSMENT — AXIALLENGTH_DERIVED
OS_AL: 24.1955
OD_AL: 24.1502
OD_AL: 24.0993
OD_AL: 24.1502
OS_AL: 24.1955
OD_AL: 24.0993

## 2022-11-02 ASSESSMENT — CORNEAL TRAUMA - ABRASION: OD_ABRASION: ABSENT

## 2022-11-02 ASSESSMENT — CONFRONTATIONAL VISUAL FIELD TEST (CVF)
OS_FINDINGS: FULL
OD_FINDINGS: FULL

## 2022-11-02 ASSESSMENT — TONOMETRY
OS_IOP_MMHG: 15
OD_IOP_MMHG: 18

## 2022-11-02 ASSESSMENT — CORNEAL SURGICAL SCARRING: OD_SCARRING: STROMAL

## 2022-11-04 ENCOUNTER — RX ONLY (RX ONLY)
Age: 72
End: 2022-11-04

## 2022-11-04 ENCOUNTER — ASC (OUTPATIENT)
Dept: URBAN - METROPOLITAN AREA SURGERY 8 | Facility: SURGERY | Age: 72
Setting detail: OPHTHALMOLOGY
End: 2022-11-04
Payer: MEDICARE

## 2022-11-04 DIAGNOSIS — H26.492: ICD-10-CM

## 2022-11-04 PROCEDURE — 66821 AFTER CATARACT LASER SURGERY: CPT | Performed by: OPHTHALMOLOGY

## 2022-11-04 ASSESSMENT — AXIALLENGTH_DERIVED
OS_AL: 24.1955
OD_AL: 24.1502
OD_AL: 24.0993

## 2022-11-04 ASSESSMENT — REFRACTION_MANIFEST
OD_SPHERE: +0.75
OD_CYLINDER: -0.75
OD_AXIS: 105
OS_AXIS: 52
OS_SPHERE: +0.50
OS_CYLINDER: -0.25

## 2022-11-04 ASSESSMENT — CORNEAL SURGICAL SCARRING: OD_SCARRING: STROMAL

## 2022-11-04 ASSESSMENT — REFRACTION_AUTOREFRACTION
OD_SPHERE: +0.50
OS_SPHERE: +0.25
OD_AXIS: 133
OS_CYLINDER: SPHERE
OD_CYLINDER: -0.50
OS_AXIS: 0

## 2022-11-04 ASSESSMENT — KERATOMETRY
OD_K1POWER_DIOPTERS: 41.25
OS_K2POWER_DIOPTERS: 41.75
OS_K1POWER_DIOPTERS: 41.25
OS_AXISANGLE_DEGREES: 90
OD_AXISANGLE_DEGREES: 76
OD_K2POWER_DIOPTERS: 42.25

## 2022-11-04 ASSESSMENT — VISUAL ACUITY
OS_BCVA: 20/50
OD_BCVA: 20/20

## 2022-11-04 ASSESSMENT — CONFRONTATIONAL VISUAL FIELD TEST (CVF)
OD_FINDINGS: FULL
OS_FINDINGS: FULL

## 2022-11-04 ASSESSMENT — SPHEQUIV_DERIVED
OD_SPHEQUIV: 0.25
OS_SPHEQUIV: 0.375
OD_SPHEQUIV: 0.375

## 2022-11-04 ASSESSMENT — CORNEAL TRAUMA - ABRASION: OD_ABRASION: ABSENT

## 2022-11-10 ENCOUNTER — OFFICE (OUTPATIENT)
Dept: URBAN - METROPOLITAN AREA CLINIC 12 | Facility: CLINIC | Age: 72
Setting detail: OPHTHALMOLOGY
End: 2022-11-10
Payer: MEDICARE

## 2022-11-10 DIAGNOSIS — H26.492: ICD-10-CM

## 2022-11-10 PROCEDURE — 99024 POSTOP FOLLOW-UP VISIT: CPT | Performed by: OPTOMETRIST

## 2022-11-10 ASSESSMENT — REFRACTION_MANIFEST
OS_CYLINDER: -0.25
OD_AXIS: 105
OS_SPHERE: +0.50
OD_SPHERE: +0.75
OS_AXIS: 52
OD_CYLINDER: -0.75

## 2022-11-10 ASSESSMENT — KERATOMETRY
OS_K2POWER_DIOPTERS: 41.75
OD_K2POWER_DIOPTERS: 42.25
OS_K1POWER_DIOPTERS: 41.25
OD_K1POWER_DIOPTERS: 41.00
OS_AXISANGLE_DEGREES: 095
OD_AXISANGLE_DEGREES: 73

## 2022-11-10 ASSESSMENT — SPHEQUIV_DERIVED
OS_SPHEQUIV: 0.375
OS_SPHEQUIV: 0.125
OD_SPHEQUIV: 0.375
OD_SPHEQUIV: 0.5

## 2022-11-10 ASSESSMENT — CONFRONTATIONAL VISUAL FIELD TEST (CVF)
OD_FINDINGS: FULL
OS_FINDINGS: FULL

## 2022-11-10 ASSESSMENT — LID EXAM ASSESSMENTS
OD_BLEPHARITIS: 1+
OS_BLEPHARITIS: 1+

## 2022-11-10 ASSESSMENT — VISUAL ACUITY
OD_BCVA: 20/20-
OS_BCVA: 20/30-

## 2022-11-10 ASSESSMENT — REFRACTION_AUTOREFRACTION
OS_SPHERE: +0.25
OD_CYLINDER: -0.50
OD_AXIS: 133
OS_CYLINDER: -0.25
OS_AXIS: 063
OD_SPHERE: +0.75

## 2022-11-10 ASSESSMENT — AXIALLENGTH_DERIVED
OD_AL: 24.0964
OS_AL: 24.1955
OD_AL: 24.1473
OS_AL: 24.2984

## 2022-11-10 ASSESSMENT — TONOMETRY
OD_IOP_MMHG: 14
OS_IOP_MMHG: 16

## 2022-11-10 ASSESSMENT — CORNEAL SURGICAL SCARRING: OD_SCARRING: STROMAL

## 2022-11-30 ENCOUNTER — OFFICE (OUTPATIENT)
Dept: URBAN - METROPOLITAN AREA CLINIC 12 | Facility: CLINIC | Age: 72
Setting detail: OPHTHALMOLOGY
End: 2022-11-30
Payer: MEDICARE

## 2022-11-30 DIAGNOSIS — D31.30: ICD-10-CM

## 2022-11-30 DIAGNOSIS — H01.004: ICD-10-CM

## 2022-11-30 DIAGNOSIS — H35.373: ICD-10-CM

## 2022-11-30 DIAGNOSIS — H43.812: ICD-10-CM

## 2022-11-30 DIAGNOSIS — H01.001: ICD-10-CM

## 2022-11-30 DIAGNOSIS — H57.11: ICD-10-CM

## 2022-11-30 DIAGNOSIS — Z96.1: ICD-10-CM

## 2022-11-30 DIAGNOSIS — H35.62: ICD-10-CM

## 2022-11-30 DIAGNOSIS — H35.3131: ICD-10-CM

## 2022-11-30 DIAGNOSIS — H43.393: ICD-10-CM

## 2022-11-30 PROCEDURE — 92250 FUNDUS PHOTOGRAPHY W/I&R: CPT | Performed by: OPTOMETRIST

## 2022-11-30 PROCEDURE — 99213 OFFICE O/P EST LOW 20 MIN: CPT | Performed by: OPTOMETRIST

## 2022-11-30 ASSESSMENT — SPHEQUIV_DERIVED
OS_SPHEQUIV: 0.125
OD_SPHEQUIV: 0.375
OD_SPHEQUIV: 0.625
OS_SPHEQUIV: 0.375

## 2022-11-30 ASSESSMENT — REFRACTION_MANIFEST
OD_SPHERE: +0.75
OS_AXIS: 52
OS_CYLINDER: -0.25
OS_SPHERE: +0.50
OD_CYLINDER: -0.75
OD_AXIS: 105

## 2022-11-30 ASSESSMENT — LID EXAM ASSESSMENTS
OD_COMMENTS: LID EVERTED
OD_COMMENTS: NO FB FOUND
OD_BLEPHARITIS: 1+
OS_BLEPHARITIS: 1+

## 2022-11-30 ASSESSMENT — CONFRONTATIONAL VISUAL FIELD TEST (CVF)
OS_FINDINGS: FULL
OD_FINDINGS: FULL

## 2022-11-30 ASSESSMENT — TONOMETRY
OD_IOP_MMHG: 13
OD_IOP_MMHG: 13
OS_IOP_MMHG: 17

## 2022-11-30 ASSESSMENT — REFRACTION_AUTOREFRACTION
OS_SPHERE: +0.25
OD_SPHERE: +1.00
OD_AXIS: 129
OD_CYLINDER: -0.75
OS_CYLINDER: -0.25
OS_AXIS: 033

## 2022-11-30 ASSESSMENT — CORNEAL SURGICAL SCARRING: OD_SCARRING: STROMAL

## 2022-11-30 ASSESSMENT — VISUAL ACUITY
OS_BCVA: 20/25-2
OD_BCVA: 20/20-

## 2022-12-16 ENCOUNTER — APPOINTMENT (OUTPATIENT)
Dept: FAMILY MEDICINE | Facility: CLINIC | Age: 72
End: 2022-12-16

## 2022-12-16 VITALS
WEIGHT: 220 LBS | BODY MASS INDEX: 32.58 KG/M2 | TEMPERATURE: 97.8 F | OXYGEN SATURATION: 95 % | HEART RATE: 67 BPM | DIASTOLIC BLOOD PRESSURE: 82 MMHG | SYSTOLIC BLOOD PRESSURE: 135 MMHG | HEIGHT: 69 IN

## 2022-12-16 DIAGNOSIS — N52.9 MALE ERECTILE DYSFUNCTION, UNSPECIFIED: ICD-10-CM

## 2022-12-16 DIAGNOSIS — H35.30 UNSPECIFIED MACULAR DEGENERATION: ICD-10-CM

## 2022-12-16 DIAGNOSIS — Z00.00 ENCOUNTER FOR GENERAL ADULT MEDICAL EXAMINATION W/OUT ABNORMAL FINDINGS: ICD-10-CM

## 2022-12-16 PROCEDURE — 99214 OFFICE O/P EST MOD 30 MIN: CPT

## 2022-12-16 RX ORDER — TRIAMCINOLONE ACETONIDE 1 MG/G
0.1 CREAM TOPICAL TWICE DAILY
Qty: 80 | Refills: 0 | Status: DISCONTINUED | COMMUNITY
Start: 2021-08-20 | End: 2022-12-16

## 2022-12-16 RX ORDER — DOXYCYCLINE HYCLATE 100 MG/1
100 TABLET ORAL
Qty: 42 | Refills: 0 | Status: DISCONTINUED | COMMUNITY
Start: 2022-06-20 | End: 2022-12-16

## 2022-12-16 RX ORDER — PREDNISONE 10 MG/1
10 TABLET ORAL DAILY
Qty: 30 | Refills: 0 | Status: DISCONTINUED | COMMUNITY
Start: 2021-11-16 | End: 2022-12-16

## 2022-12-16 NOTE — PHYSICAL EXAM
[No JVD] : no jugular venous distention [Thyroid Normal, No Nodules] : the thyroid was normal and there were no nodules present [No Carotid Bruits] : no carotid bruits [Pedal Pulses Present] : the pedal pulses are present [No Edema] : there was no peripheral edema [Normal Supraclavicular Nodes] : no supraclavicular lymphadenopathy [Normal Posterior Cervical Nodes] : no posterior cervical lymphadenopathy [Normal Anterior Cervical Nodes] : no anterior cervical lymphadenopathy [Normal] : no joint swelling and grossly normal strength and tone

## 2022-12-16 NOTE — HISTORY OF PRESENT ILLNESS
[FreeTextEntry1] : Checkup [de-identified] : Patient has no complaints.  There is minimal if any right arm weakness post CVA.  Followed by ophthalmology has mild macular degeneration.  Requests Viagra for erectile dysfunction\par \par Hypertension well-controlled on amlodipine and lisinopril\par \par Hyperlipidemia on simvastatin post CVA was hemorrhagic\par \par Status post prostate cancer no urinary difficulties but having erectile dysfunction we will prescribe Viagra

## 2022-12-17 LAB
ALBUMIN SERPL ELPH-MCNC: 4.5 G/DL
ALP BLD-CCNC: 105 U/L
ALT SERPL-CCNC: 25 U/L
ANION GAP SERPL CALC-SCNC: 10 MMOL/L
AST SERPL-CCNC: 21 U/L
BILIRUB SERPL-MCNC: 0.4 MG/DL
BUN SERPL-MCNC: 23 MG/DL
CALCIUM SERPL-MCNC: 9.8 MG/DL
CHLORIDE SERPL-SCNC: 106 MMOL/L
CHOLEST SERPL-MCNC: 144 MG/DL
CO2 SERPL-SCNC: 27 MMOL/L
CREAT SERPL-MCNC: 0.83 MG/DL
EGFR: 92 ML/MIN/1.73M2
ESTIMATED AVERAGE GLUCOSE: 120 MG/DL
GLUCOSE SERPL-MCNC: 162 MG/DL
HBA1C MFR BLD HPLC: 5.8 %
HDLC SERPL-MCNC: 54 MG/DL
LDLC SERPL CALC-MCNC: 76 MG/DL
NONHDLC SERPL-MCNC: 90 MG/DL
POTASSIUM SERPL-SCNC: 4.6 MMOL/L
PROT SERPL-MCNC: 6.8 G/DL
PSA SERPL-MCNC: 0.55 NG/ML
SODIUM SERPL-SCNC: 143 MMOL/L
TRIGL SERPL-MCNC: 72 MG/DL

## 2022-12-30 ENCOUNTER — OFFICE (OUTPATIENT)
Dept: URBAN - METROPOLITAN AREA CLINIC 12 | Facility: CLINIC | Age: 72
Setting detail: OPHTHALMOLOGY
End: 2022-12-30
Payer: MEDICARE

## 2022-12-30 DIAGNOSIS — H01.001: ICD-10-CM

## 2022-12-30 DIAGNOSIS — Z96.1: ICD-10-CM

## 2022-12-30 DIAGNOSIS — H16.223: ICD-10-CM

## 2022-12-30 DIAGNOSIS — H35.3131: ICD-10-CM

## 2022-12-30 DIAGNOSIS — H57.11: ICD-10-CM

## 2022-12-30 DIAGNOSIS — D31.30: ICD-10-CM

## 2022-12-30 DIAGNOSIS — H43.812: ICD-10-CM

## 2022-12-30 DIAGNOSIS — H01.004: ICD-10-CM

## 2022-12-30 DIAGNOSIS — H43.393: ICD-10-CM

## 2022-12-30 DIAGNOSIS — H35.62: ICD-10-CM

## 2022-12-30 DIAGNOSIS — H35.373: ICD-10-CM

## 2022-12-30 PROCEDURE — 99213 OFFICE O/P EST LOW 20 MIN: CPT | Performed by: STUDENT IN AN ORGANIZED HEALTH CARE EDUCATION/TRAINING PROGRAM

## 2022-12-30 ASSESSMENT — LID EXAM ASSESSMENTS
OS_BLEPHARITIS: 1+
OD_COMMENTS: LID EVERTED
OD_BLEPHARITIS: 1+
OD_COMMENTS: NO FB FOUND

## 2022-12-30 ASSESSMENT — TONOMETRY
OD_IOP_MMHG: 14
OS_IOP_MMHG: 16

## 2022-12-30 ASSESSMENT — CORNEAL SURGICAL SCARRING: OD_SCARRING: STROMAL

## 2022-12-30 ASSESSMENT — SUPERFICIAL PUNCTATE KERATITIS (SPK)
OS_SPK: T
OD_SPK: T

## 2022-12-30 ASSESSMENT — CONFRONTATIONAL VISUAL FIELD TEST (CVF)
OS_FINDINGS: FULL
OD_FINDINGS: FULL

## 2022-12-31 ASSESSMENT — REFRACTION_AUTOREFRACTION
OD_AXIS: 144
OS_SPHERE: +0.25
OD_SPHERE: +0.75
OS_CYLINDER: SPHERE
OS_AXIS: 000
OD_CYLINDER: -0.50

## 2022-12-31 ASSESSMENT — SPHEQUIV_DERIVED
OD_SPHEQUIV: 0.5
OD_SPHEQUIV: 0.5

## 2022-12-31 ASSESSMENT — VISUAL ACUITY
OD_BCVA: 20/20
OS_BCVA: 20/40

## 2022-12-31 ASSESSMENT — REFRACTION_MANIFEST
OD_AXIS: 145
OD_CYLINDER: -0.50
OS_SPHERE: +0.25
OS_AXIS: 000
OD_SPHERE: +0.75
OS_CYLINDER: SPHERE

## 2023-03-21 ENCOUNTER — OFFICE (OUTPATIENT)
Dept: URBAN - METROPOLITAN AREA CLINIC 12 | Facility: CLINIC | Age: 73
Setting detail: OPHTHALMOLOGY
End: 2023-03-21
Payer: MEDICARE

## 2023-03-21 DIAGNOSIS — H16.223: ICD-10-CM

## 2023-03-21 DIAGNOSIS — H01.004: ICD-10-CM

## 2023-03-21 DIAGNOSIS — H57.11: ICD-10-CM

## 2023-03-21 DIAGNOSIS — H01.001: ICD-10-CM

## 2023-03-21 PROCEDURE — 92012 INTRM OPH EXAM EST PATIENT: CPT | Performed by: STUDENT IN AN ORGANIZED HEALTH CARE EDUCATION/TRAINING PROGRAM

## 2023-03-21 ASSESSMENT — REFRACTION_MANIFEST
OS_SPHERE: +0.25
OD_AXIS: 145
OD_CYLINDER: -0.50
OS_AXIS: 000
OS_CYLINDER: SPHERE
OD_SPHERE: +0.75

## 2023-03-21 ASSESSMENT — KERATOMETRY
OD_AXISANGLE_DEGREES: 60
OD_K1POWER_DIOPTERS: 41.25
OD_K2POWER_DIOPTERS: 42.00
OS_AXISANGLE_DEGREES: 84
OS_K1POWER_DIOPTERS: 41.50
OS_K2POWER_DIOPTERS: 41.75
METHOD_AUTO_MANUAL: AUTO

## 2023-03-21 ASSESSMENT — REFRACTION_AUTOREFRACTION
OD_SPHERE: +0.75
OD_CYLINDER: -0.75
OS_SPHERE: +0.75
OS_CYLINDER: -0.25
OD_AXIS: 128
OS_AXIS: 86

## 2023-03-21 ASSESSMENT — LID EXAM ASSESSMENTS
OD_BLEPHARITIS: 1+
OS_BLEPHARITIS: 1+
OD_COMMENTS: LID EVERTED
OD_COMMENTS: NO FB FOUND

## 2023-03-21 ASSESSMENT — CONFRONTATIONAL VISUAL FIELD TEST (CVF)
OS_FINDINGS: FULL
OD_FINDINGS: FULL

## 2023-03-21 ASSESSMENT — SPHEQUIV_DERIVED
OD_SPHEQUIV: 0.5
OD_SPHEQUIV: 0.375
OS_SPHEQUIV: 0.625

## 2023-03-21 ASSESSMENT — AXIALLENGTH_DERIVED
OD_AL: 24.1473
OD_AL: 24.0964
OS_AL: 24.0457

## 2023-03-21 ASSESSMENT — VISUAL ACUITY
OD_BCVA: 20/20-2
OS_BCVA: 20/30-1

## 2023-03-21 ASSESSMENT — CORNEAL SURGICAL SCARRING: OD_SCARRING: STROMAL

## 2023-03-21 ASSESSMENT — TONOMETRY
OS_IOP_MMHG: 17
OD_IOP_MMHG: 16

## 2023-03-21 ASSESSMENT — SUPERFICIAL PUNCTATE KERATITIS (SPK)
OS_SPK: T
OD_SPK: T

## 2023-03-29 ENCOUNTER — OFFICE (OUTPATIENT)
Dept: URBAN - METROPOLITAN AREA CLINIC 12 | Facility: CLINIC | Age: 73
Setting detail: OPHTHALMOLOGY
End: 2023-03-29
Payer: MEDICARE

## 2023-03-29 DIAGNOSIS — H35.3131: ICD-10-CM

## 2023-03-29 DIAGNOSIS — H57.11: ICD-10-CM

## 2023-03-29 DIAGNOSIS — H01.001: ICD-10-CM

## 2023-03-29 DIAGNOSIS — H01.004: ICD-10-CM

## 2023-03-29 DIAGNOSIS — H16.223: ICD-10-CM

## 2023-03-29 PROCEDURE — 99213 OFFICE O/P EST LOW 20 MIN: CPT | Performed by: OPTOMETRIST

## 2023-03-29 ASSESSMENT — SPHEQUIV_DERIVED
OD_SPHEQUIV: 0.5
OS_SPHEQUIV: 0.375
OD_SPHEQUIV: 0.5

## 2023-03-29 ASSESSMENT — REFRACTION_MANIFEST
OD_CYLINDER: -0.50
OS_AXIS: 000
OS_SPHERE: +0.25
OS_CYLINDER: SPHERE
OD_AXIS: 145
OD_SPHERE: +0.75

## 2023-03-29 ASSESSMENT — LID EXAM ASSESSMENTS
OS_BLEPHARITIS: 1+
OD_BLEPHARITIS: 1+

## 2023-03-29 ASSESSMENT — REFRACTION_AUTOREFRACTION
OD_SPHERE: +0.75
OS_SPHERE: +0.50
OS_AXIS: 050
OD_AXIS: 129
OD_CYLINDER: -0.50
OS_CYLINDER: -0.25

## 2023-03-29 ASSESSMENT — KERATOMETRY
OS_AXISANGLE_DEGREES: 083
OS_K2POWER_DIOPTERS: 41.75
OD_K1POWER_DIOPTERS: 41.25
OD_K2POWER_DIOPTERS: 42.00
OS_K1POWER_DIOPTERS: 41.00
OD_AXISANGLE_DEGREES: 072
METHOD_AUTO_MANUAL: AUTO

## 2023-03-29 ASSESSMENT — AXIALLENGTH_DERIVED
OD_AL: 24.0964
OS_AL: 24.2439
OD_AL: 24.0964

## 2023-03-29 ASSESSMENT — VISUAL ACUITY
OS_BCVA: 20/25-1
OD_BCVA: 20/20-2

## 2023-03-29 ASSESSMENT — TONOMETRY
OS_IOP_MMHG: 15
OD_IOP_MMHG: 15

## 2023-03-29 ASSESSMENT — CORNEAL SURGICAL SCARRING: OD_SCARRING: STROMAL

## 2023-03-29 ASSESSMENT — CONFRONTATIONAL VISUAL FIELD TEST (CVF)
OS_FINDINGS: FULL
OD_FINDINGS: FULL

## 2023-03-29 ASSESSMENT — SUPERFICIAL PUNCTATE KERATITIS (SPK)
OD_SPK: T
OS_SPK: T

## 2023-04-22 ENCOUNTER — RX RENEWAL (OUTPATIENT)
Age: 73
End: 2023-04-22

## 2023-05-12 ENCOUNTER — RX RENEWAL (OUTPATIENT)
Age: 73
End: 2023-05-12

## 2023-05-22 ENCOUNTER — OFFICE (OUTPATIENT)
Dept: URBAN - METROPOLITAN AREA CLINIC 12 | Facility: CLINIC | Age: 73
Setting detail: OPHTHALMOLOGY
End: 2023-05-22
Payer: MEDICARE

## 2023-05-22 ENCOUNTER — RX ONLY (RX ONLY)
Age: 73
End: 2023-05-22

## 2023-05-22 DIAGNOSIS — H01.001: ICD-10-CM

## 2023-05-22 DIAGNOSIS — H57.11: ICD-10-CM

## 2023-05-22 DIAGNOSIS — H01.004: ICD-10-CM

## 2023-05-22 DIAGNOSIS — H16.223: ICD-10-CM

## 2023-05-22 PROCEDURE — 99213 OFFICE O/P EST LOW 20 MIN: CPT | Performed by: STUDENT IN AN ORGANIZED HEALTH CARE EDUCATION/TRAINING PROGRAM

## 2023-05-22 ASSESSMENT — REFRACTION_AUTOREFRACTION
OD_AXIS: 134
OS_SPHERE: +0.75
OS_AXIS: 060
OS_CYLINDER: -0.25
OD_CYLINDER: -0.50
OD_SPHERE: +0.75

## 2023-05-22 ASSESSMENT — REFRACTION_MANIFEST
OD_AXIS: 145
OS_AXIS: 000
OD_SPHERE: +0.75
OS_SPHERE: +0.25
OS_CYLINDER: SPHERE
OD_CYLINDER: -0.50

## 2023-05-22 ASSESSMENT — TEAR BREAK UP TIME (TBUT)
OD_TBUT: 2 SEC
OS_TBUT: 2 SEC

## 2023-05-22 ASSESSMENT — KERATOMETRY
OD_K1POWER_DIOPTERS: 41.25
OS_K2POWER_DIOPTERS: 41.75
METHOD_AUTO_MANUAL: AUTO
OS_AXISANGLE_DEGREES: 089
OS_K1POWER_DIOPTERS: 41.25
OD_AXISANGLE_DEGREES: 069
OD_K2POWER_DIOPTERS: 42.25

## 2023-05-22 ASSESSMENT — SPHEQUIV_DERIVED
OD_SPHEQUIV: 0.5
OD_SPHEQUIV: 0.5
OS_SPHEQUIV: 0.625

## 2023-05-22 ASSESSMENT — SUPERFICIAL PUNCTATE KERATITIS (SPK)
OS_SPK: T
OD_SPK: T

## 2023-05-22 ASSESSMENT — AXIALLENGTH_DERIVED
OD_AL: 24.0486
OD_AL: 24.0486
OS_AL: 24.0935

## 2023-05-22 ASSESSMENT — CORNEAL SURGICAL SCARRING: OD_SCARRING: STROMAL

## 2023-05-22 ASSESSMENT — LID EXAM ASSESSMENTS
OD_BLEPHARITIS: 1+
OS_BLEPHARITIS: 1+

## 2023-05-22 ASSESSMENT — VISUAL ACUITY
OD_BCVA: 20/20
OS_BCVA: 20/25-2

## 2023-05-22 ASSESSMENT — TONOMETRY
OD_IOP_MMHG: 12
OS_IOP_MMHG: 13

## 2023-05-22 ASSESSMENT — CONFRONTATIONAL VISUAL FIELD TEST (CVF)
OD_FINDINGS: FULL
OS_FINDINGS: FULL

## 2023-05-31 ENCOUNTER — OFFICE (OUTPATIENT)
Dept: URBAN - METROPOLITAN AREA CLINIC 88 | Facility: CLINIC | Age: 73
Setting detail: OPHTHALMOLOGY
End: 2023-05-31
Payer: MEDICARE

## 2023-05-31 DIAGNOSIS — H43.812: ICD-10-CM

## 2023-05-31 DIAGNOSIS — D31.30: ICD-10-CM

## 2023-05-31 DIAGNOSIS — H35.373: ICD-10-CM

## 2023-05-31 DIAGNOSIS — H35.3131: ICD-10-CM

## 2023-05-31 DIAGNOSIS — H43.393: ICD-10-CM

## 2023-05-31 PROCEDURE — 99213 OFFICE O/P EST LOW 20 MIN: CPT | Performed by: SPECIALIST

## 2023-05-31 PROCEDURE — 92134 CPTRZ OPH DX IMG PST SGM RTA: CPT | Performed by: SPECIALIST

## 2023-05-31 ASSESSMENT — VISUAL ACUITY
OD_BCVA: 20/20-1
OS_BCVA: 20/25

## 2023-05-31 ASSESSMENT — SUPERFICIAL PUNCTATE KERATITIS (SPK)
OD_SPK: T
OS_SPK: T

## 2023-05-31 ASSESSMENT — TONOMETRY
OD_IOP_MMHG: 15
OS_IOP_MMHG: 13

## 2023-05-31 ASSESSMENT — LID EXAM ASSESSMENTS
OS_BLEPHARITIS: 1+
OD_BLEPHARITIS: 1+

## 2023-05-31 ASSESSMENT — REFRACTION_AUTOREFRACTION
OS_SPHERE: +0.75
OS_CYLINDER: -0.25
OD_CYLINDER: -0.50
OD_AXIS: 134
OS_AXIS: 060
OD_SPHERE: +0.75

## 2023-05-31 ASSESSMENT — SPHEQUIV_DERIVED
OS_SPHEQUIV: 0.625
OD_SPHEQUIV: 0.5

## 2023-05-31 ASSESSMENT — TEAR BREAK UP TIME (TBUT)
OD_TBUT: 2 SEC
OS_TBUT: 2 SEC

## 2023-05-31 ASSESSMENT — CONFRONTATIONAL VISUAL FIELD TEST (CVF)
OD_FINDINGS: FULL
OS_FINDINGS: FULL

## 2023-05-31 ASSESSMENT — CORNEAL SURGICAL SCARRING: OD_SCARRING: STROMAL

## 2023-06-19 ENCOUNTER — RX RENEWAL (OUTPATIENT)
Age: 73
End: 2023-06-19

## 2023-06-22 ENCOUNTER — RX RENEWAL (OUTPATIENT)
Age: 73
End: 2023-06-22

## 2023-07-05 ENCOUNTER — APPOINTMENT (OUTPATIENT)
Dept: FAMILY MEDICINE | Facility: CLINIC | Age: 73
End: 2023-07-05
Payer: MEDICARE

## 2023-07-05 VITALS
OXYGEN SATURATION: 96 % | HEIGHT: 69 IN | HEART RATE: 59 BPM | SYSTOLIC BLOOD PRESSURE: 122 MMHG | DIASTOLIC BLOOD PRESSURE: 70 MMHG | TEMPERATURE: 97.9 F | BODY MASS INDEX: 31.4 KG/M2 | WEIGHT: 212 LBS
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
## 2023-07-05 DIAGNOSIS — S30.860A INSECT BITE (NONVENOMOUS) OF LOWER BACK AND PELVIS, INITIAL ENCOUNTER: ICD-10-CM

## 2023-07-05 PROCEDURE — 99213 OFFICE O/P EST LOW 20 MIN: CPT | Mod: 25

## 2023-07-05 PROCEDURE — 17000 DESTRUCT PREMALG LESION: CPT

## 2023-07-05 PROCEDURE — 17003 DESTRUCT PREMALG LES 2-14: CPT

## 2023-07-05 NOTE — HISTORY OF PRESENT ILLNESS
[FreeTextEntry8] : Tick bite right ankle area 3 weeks ago remains pruritic slowly healing no symptoms of Lyme disease no bull's-eye rash patient reassured patient will treat clothing with permethrin spray\par \par Actinic keratosis 1 spot left forehead 1 spot right forehead cryotherapy administered to both lesions for 12 seconds with good freeze ball\par \par Seborrheic keratosis right hairline of forehead return for excision as needed

## 2023-07-20 ENCOUNTER — APPOINTMENT (OUTPATIENT)
Dept: FAMILY MEDICINE | Facility: CLINIC | Age: 73
End: 2023-07-20
Payer: MEDICARE

## 2023-07-20 DIAGNOSIS — M25.511 PAIN IN RIGHT SHOULDER: ICD-10-CM

## 2023-07-20 PROCEDURE — 11440 EXC FACE-MM B9+MARG 0.5 CM/<: CPT

## 2023-07-20 PROCEDURE — 99213 OFFICE O/P EST LOW 20 MIN: CPT | Mod: 25

## 2023-07-20 NOTE — PROCEDURE
[Excision Of Lesion] : excision of lesion [Alcohol] : Alcohol [1%] : 1% [With Epi] : with epinephrine [FreeTextEntry3] : 1 cm seborrheic keratosis right lateral hairline.  Alcohol wipe to 10 cc lido and epi cautery and curettage to clean base Betadine and Band-Aid applied

## 2023-09-28 ENCOUNTER — APPOINTMENT (OUTPATIENT)
Dept: CT IMAGING | Facility: CLINIC | Age: 73
End: 2023-09-28
Payer: MEDICARE

## 2023-09-28 ENCOUNTER — NON-APPOINTMENT (OUTPATIENT)
Age: 73
End: 2023-09-28

## 2023-09-28 ENCOUNTER — APPOINTMENT (OUTPATIENT)
Dept: FAMILY MEDICINE | Facility: CLINIC | Age: 73
End: 2023-09-28
Payer: MEDICARE

## 2023-09-28 ENCOUNTER — OUTPATIENT (OUTPATIENT)
Dept: OUTPATIENT SERVICES | Facility: HOSPITAL | Age: 73
LOS: 1 days | End: 2023-09-28
Payer: MEDICARE

## 2023-09-28 VITALS
TEMPERATURE: 97.5 F | SYSTOLIC BLOOD PRESSURE: 128 MMHG | OXYGEN SATURATION: 94 % | WEIGHT: 206 LBS | HEART RATE: 86 BPM | BODY MASS INDEX: 30.51 KG/M2 | HEIGHT: 69 IN | DIASTOLIC BLOOD PRESSURE: 82 MMHG

## 2023-09-28 DIAGNOSIS — I20.8 OTHER FORMS OF ANGINA PECTORIS: ICD-10-CM

## 2023-09-28 DIAGNOSIS — I20.9 ANGINA PECTORIS, UNSPECIFIED: ICD-10-CM

## 2023-09-28 PROCEDURE — 71275 CT ANGIOGRAPHY CHEST: CPT | Mod: 26,MH

## 2023-09-28 PROCEDURE — 93000 ELECTROCARDIOGRAM COMPLETE: CPT

## 2023-09-28 PROCEDURE — 99214 OFFICE O/P EST MOD 30 MIN: CPT | Mod: 25

## 2023-09-28 PROCEDURE — 71275 CT ANGIOGRAPHY CHEST: CPT

## 2023-09-29 ENCOUNTER — APPOINTMENT (OUTPATIENT)
Dept: CARDIOLOGY | Facility: CLINIC | Age: 73
End: 2023-09-29
Payer: MEDICARE

## 2023-09-29 VITALS
DIASTOLIC BLOOD PRESSURE: 72 MMHG | HEIGHT: 69 IN | OXYGEN SATURATION: 97 % | SYSTOLIC BLOOD PRESSURE: 122 MMHG | BODY MASS INDEX: 31.25 KG/M2 | HEART RATE: 59 BPM | WEIGHT: 211 LBS

## 2023-09-29 PROCEDURE — 93000 ELECTROCARDIOGRAM COMPLETE: CPT

## 2023-09-29 PROCEDURE — 99204 OFFICE O/P NEW MOD 45 MIN: CPT | Mod: 25

## 2023-09-29 RX ORDER — NITROGLYCERIN 0.4 MG/1
0.4 TABLET SUBLINGUAL
Qty: 90 | Refills: 0 | Status: ACTIVE | COMMUNITY
Start: 2023-09-29 | End: 1900-01-01

## 2023-09-29 RX ORDER — ATORVASTATIN CALCIUM 40 MG/1
40 TABLET, FILM COATED ORAL
Qty: 90 | Refills: 3 | Status: ACTIVE | COMMUNITY
Start: 2023-09-29 | End: 1900-01-01

## 2023-10-06 NOTE — H&P ADULT - PROBLEM SELECTOR PLAN 1
a/w CP during exercise   -plan for cardiac cath for ischemic work up  - PAYAL protocol pre hydration: NS 250cc IV bolus x1   -Consent obtained for cardiac catheterization w/ coronary angiogram and possible stent placement, with possible sedation and analgia. Pt is competent, has capacity, and understands risks and benefits of procedure. Risks and benefits discussed. Risk discussed included, but not limited to MI, stroke, mortality, major bleeding, arrythmia, or infection. All questions answered

## 2023-10-06 NOTE — H&P ADULT - ASSESSMENT
72 y/o M with PMHx of HTN, HLD, CVA presented to cardiology with c/o CP during exercise.  Recent CTA chest showing coronary calcifications. Referred for cardiac cath to further evaluate     ASA class:  Creatinine:  GFR:  Bleeding  Risk score:  Piotr Score:  74 y/o M with PMHx of HTN, HLD, CVA presented to cardiology with c/o CP during exercise.  Recent CTA chest showing coronary calcifications. Referred for cardiac cath to further evaluate     ASA class: II  Creatinine: 0.82  GFR: 92  Bleeding  Risk score: 1.2  Piotr Score: 1 point 74 y/o M with PMHx of HTN, HLD, CVA presented to cardiology with c/o CP during exercise which resolves with rest. Recent CTA chest showing coronary calcifications. Referred for cardiac cath to further evaluate     ASA class: II  Creatinine: 0.82  GFR: 92  Bleeding  Risk score: 1.2  Piotr Score: 1 point

## 2023-10-06 NOTE — H&P ADULT - NSICDXPASTMEDICALHX_GEN_ALL_CORE_FT
PAST MEDICAL HISTORY:  CVA (cerebrovascular accident)     HLD (hyperlipidemia)     HTN (hypertension)     Pre-diabetes

## 2023-10-06 NOTE — H&P ADULT - HISTORY OF PRESENT ILLNESS
72 y/o M with PMHx of HTN, HLD, CVA presented to cardiology with c/o CP during exercise.  Recent CTA chest showing coronary calcifications. Referred for cardiac cath to further evaluate  74 y/o M with PMHx of HTN, HLD, CVA presented to cardiology with c/o CP during exercise which resolves at rest.  Recent CTA chest showing coronary calcifications. Referred for cardiac cath to further evaluate

## 2023-10-06 NOTE — H&P ADULT - NSHPREVIEWOFSYSTEMS_GEN_ALL_CORE
CONSTITUTIONAL: No fever, weight loss, or fatigue  EYES: No eye pain, visual disturbances, or discharge  ENMT:  No difficulty hearing, tinnitus, vertigo; No sinus or throat pain  NECK: No pain or stiffness  BREASTS: No pain, masses, or nipple discharge  RESPIRATORY: No cough, wheezing, chills or hemoptysis; No shortness of breath  CARDIOVASCULAR:  +chest pain,  GASTROINTESTINAL: No abdominal or epigastric pain. No nausea, vomiting, or hematemesis; No diarrhea or constipation. No melena or hematochezia.  GENITOURINARY: No dysuria, frequency, hematuria, or incontinence  NEUROLOGICAL: No headaches, memory loss, loss of strength, numbness, or tremors  SKIN: No itching, burning, rashes, or lesions   ENDOCRINE: No heat or cold intolerance; No hair loss  MUSCULOSKELETAL: No joint pain or swelling; No muscle, back, or extremity pain  PSYCHIATRIC: No depression, anxiety, mood swings, or difficulty sleeping CONSTITUTIONAL: No fever, weight loss, or fatigue  EYES: No eye pain, visual disturbances, or discharge  ENMT:  No difficulty hearing, tinnitus, vertigo; No sinus or throat pain  NECK: No pain or stiffness  BREASTS: No pain, masses, or nipple discharge  RESPIRATORY: No cough, wheezing, chills or hemoptysis; No shortness of breath  CARDIOVASCULAR:  +chest pain upon exertion   GASTROINTESTINAL: No abdominal or epigastric pain. No nausea, vomiting, or hematemesis; No diarrhea or constipation. No melena or hematochezia.  GENITOURINARY: No dysuria, frequency, hematuria, or incontinence  NEUROLOGICAL: No headaches, memory loss, loss of strength, numbness, or tremors  SKIN: No itching, burning, rashes, or lesions   ENDOCRINE: No heat or cold intolerance; No hair loss  MUSCULOSKELETAL: No joint pain or swelling; No muscle, back, or extremity pain  PSYCHIATRIC: No depression, anxiety, mood swings, or difficulty sleeping

## 2023-10-18 LAB
ANION GAP SERPL CALC-SCNC: 9 MMOL/L
BUN SERPL-MCNC: 21 MG/DL
CALCIUM SERPL-MCNC: 9.1 MG/DL
CHLORIDE SERPL-SCNC: 108 MMOL/L
CO2 SERPL-SCNC: 27 MMOL/L
CREAT SERPL-MCNC: 0.82 MG/DL
EGFR: 92 ML/MIN/1.73M2
GLUCOSE SERPL-MCNC: 111 MG/DL
HCT VFR BLD CALC: 43.7 %
HGB BLD-MCNC: 14.6 G/DL
MCHC RBC-ENTMCNC: 29.7 PG
MCHC RBC-ENTMCNC: 33.4 GM/DL
MCV RBC AUTO: 89 FL
PLATELET # BLD AUTO: 156 K/UL
POTASSIUM SERPL-SCNC: 5 MMOL/L
RBC # BLD: 4.91 M/UL
RBC # FLD: 14.3 %
SODIUM SERPL-SCNC: 144 MMOL/L
WBC # FLD AUTO: 4.48 K/UL

## 2023-10-20 ENCOUNTER — OUTPATIENT (OUTPATIENT)
Dept: OUTPATIENT SERVICES | Facility: HOSPITAL | Age: 73
LOS: 1 days | End: 2023-10-20
Payer: MEDICARE

## 2023-10-20 ENCOUNTER — APPOINTMENT (OUTPATIENT)
Dept: CT IMAGING | Facility: CLINIC | Age: 73
End: 2023-10-20
Payer: MEDICARE

## 2023-10-20 DIAGNOSIS — I25.810 ATHEROSCLEROSIS OF CORONARY ARTERY BYPASS GRAFT(S) WITHOUT ANGINA PECTORIS: ICD-10-CM

## 2023-10-20 PROCEDURE — 75574 CT ANGIO HRT W/3D IMAGE: CPT | Mod: 26,MH

## 2023-10-20 PROCEDURE — 75574 CT ANGIO HRT W/3D IMAGE: CPT

## 2023-11-02 ENCOUNTER — OUTPATIENT (OUTPATIENT)
Dept: OUTPATIENT SERVICES | Facility: HOSPITAL | Age: 73
LOS: 1 days | Discharge: HOME | End: 2023-11-02
Payer: MEDICARE

## 2023-11-02 VITALS
HEIGHT: 69 IN | TEMPERATURE: 98 F | DIASTOLIC BLOOD PRESSURE: 71 MMHG | RESPIRATION RATE: 15 BRPM | WEIGHT: 207.01 LBS | OXYGEN SATURATION: 97 % | SYSTOLIC BLOOD PRESSURE: 152 MMHG | HEART RATE: 49 BPM

## 2023-11-02 VITALS
HEART RATE: 50 BPM | OXYGEN SATURATION: 100 % | RESPIRATION RATE: 16 BRPM | DIASTOLIC BLOOD PRESSURE: 70 MMHG | SYSTOLIC BLOOD PRESSURE: 135 MMHG

## 2023-11-02 DIAGNOSIS — R07.9 CHEST PAIN, UNSPECIFIED: ICD-10-CM

## 2023-11-02 DIAGNOSIS — R93.89 ABNORMAL FINDINGS ON DIAGNOSTIC IMAGING OF OTHER SPECIFIED BODY STRUCTURES: ICD-10-CM

## 2023-11-02 DIAGNOSIS — R94.39 ABNORMAL RESULT OF OTHER CARDIOVASCULAR FUNCTION STUDY: ICD-10-CM

## 2023-11-02 LAB
BLD GP AB SCN SERPL QL: SIGNIFICANT CHANGE UP
BLD GP AB SCN SERPL QL: SIGNIFICANT CHANGE UP

## 2023-11-02 PROCEDURE — 86900 BLOOD TYPING SEROLOGIC ABO: CPT

## 2023-11-02 PROCEDURE — C1894: CPT

## 2023-11-02 PROCEDURE — C1887: CPT

## 2023-11-02 PROCEDURE — C1769: CPT

## 2023-11-02 PROCEDURE — C1753: CPT

## 2023-11-02 PROCEDURE — 93005 ELECTROCARDIOGRAM TRACING: CPT

## 2023-11-02 PROCEDURE — 36415 COLL VENOUS BLD VENIPUNCTURE: CPT

## 2023-11-02 PROCEDURE — 92978 ENDOLUMINL IVUS OCT C 1ST: CPT | Mod: LD

## 2023-11-02 PROCEDURE — 93010 ELECTROCARDIOGRAM REPORT: CPT

## 2023-11-02 PROCEDURE — 93458 L HRT ARTERY/VENTRICLE ANGIO: CPT | Mod: 59

## 2023-11-02 PROCEDURE — 86901 BLOOD TYPING SEROLOGIC RH(D): CPT

## 2023-11-02 PROCEDURE — C1725: CPT

## 2023-11-02 PROCEDURE — C1874: CPT

## 2023-11-02 PROCEDURE — 86850 RBC ANTIBODY SCREEN: CPT

## 2023-11-02 PROCEDURE — 93799 UNLISTED CV SVC/PROCEDURE: CPT

## 2023-11-02 PROCEDURE — C9600: CPT | Mod: LD

## 2023-11-02 RX ORDER — SIMVASTATIN 20 MG/1
1 TABLET, FILM COATED ORAL
Refills: 0 | DISCHARGE

## 2023-11-02 RX ORDER — METOPROLOL TARTRATE 50 MG
1 TABLET ORAL
Refills: 0 | DISCHARGE

## 2023-11-02 RX ORDER — ASPIRIN/CALCIUM CARB/MAGNESIUM 324 MG
1 TABLET ORAL
Refills: 0 | DISCHARGE

## 2023-11-02 RX ORDER — NITROGLYCERIN 6.5 MG
1 CAPSULE, EXTENDED RELEASE ORAL
Refills: 0 | DISCHARGE

## 2023-11-02 RX ORDER — CLOPIDOGREL BISULFATE 75 MG/1
1 TABLET, FILM COATED ORAL
Qty: 30 | Refills: 10
Start: 2023-11-02 | End: 2024-09-26

## 2023-11-02 RX ORDER — SODIUM CHLORIDE 9 MG/ML
250 INJECTION INTRAMUSCULAR; INTRAVENOUS; SUBCUTANEOUS ONCE
Refills: 0 | Status: COMPLETED | OUTPATIENT
Start: 2023-11-02 | End: 2023-11-02

## 2023-11-02 RX ORDER — SODIUM CHLORIDE 9 MG/ML
1000 INJECTION INTRAMUSCULAR; INTRAVENOUS; SUBCUTANEOUS
Refills: 0 | Status: DISCONTINUED | OUTPATIENT
Start: 2023-11-02 | End: 2023-11-02

## 2023-11-02 RX ORDER — ATORVASTATIN CALCIUM 80 MG/1
1 TABLET, FILM COATED ORAL
Refills: 0 | DISCHARGE

## 2023-11-02 RX ORDER — AMLODIPINE BESYLATE 2.5 MG/1
1 TABLET ORAL
Refills: 0 | DISCHARGE

## 2023-11-02 RX ORDER — LISINOPRIL 2.5 MG/1
1 TABLET ORAL
Refills: 0 | DISCHARGE

## 2023-11-02 RX ADMIN — SODIUM CHLORIDE 250 MILLILITER(S): 9 INJECTION INTRAMUSCULAR; INTRAVENOUS; SUBCUTANEOUS at 07:30

## 2023-11-02 RX ADMIN — SODIUM CHLORIDE 180 MILLILITER(S): 9 INJECTION INTRAMUSCULAR; INTRAVENOUS; SUBCUTANEOUS at 09:56

## 2023-11-02 NOTE — PACU DISCHARGE NOTE - COMMENTS
Patient s/p LHC with stent. Post EKG complete. Patient A&Ox4. VSS. PIV WDL removed with no infiltration noted. Patient right Radial dressing clean dry intact, no s/s of hematoma or bleeding noted. Patient with some bruising noted to right wrist, NP Dirk aware, no intervention at this time. Patient discharged home as per orders, reviewed discharge instructions including mediation list and new medication Plavix. Patient verbalized understanding to all without concerns. Patient ambulating in room, voiding adequately. Patient safely transferred off unit in wheelchair with transport team.

## 2023-11-02 NOTE — PROGRESS NOTE ADULT - SUBJECTIVE AND OBJECTIVE BOX
Nurse Practitioner Progress note:     HPI:  72 y/o M with PMHx of HTN, HLD, CVA presented to cardiology with c/o CP during exercise which resolves at rest.  Recent CTA chest showing coronary calcifications. Referred for cardiac cath to further evaluate  (06 Oct 2023 13:51)    T(C): 36.5 (11-02-23 @ 06:48), Max: 36.5 (11-02-23 @ 06:48)  HR: 51 (11-02-23 @ 10:15) (47 - 54)  BP: 108/58 (11-02-23 @ 10:15) (108/58 - 152/71)  RR: 16 (11-02-23 @ 10:15) (15 - 16)  SpO2: 99% (11-02-23 @ 10:15) (95% - 100%)  Wt(kg): --    PHYSICAL EXAM:  Neurologic: Non-focal, AxOx3.  No neuro deficits  Vascular: Peripheral pulses palpable 2+ bilaterally  Procedure Site: Rt. radial band in place site benign soft  no bleeding no hematoma +1 radial pulse no c/o numbness/tingling, <3sec cap refill, fingers/hand warm to touch    PROCEDURE RESULTS:  < from: Cardiac Catheterization (11.02.23 @ 07:56) >  Conclusions:   1. Severe atherosclerosis of mid LAD, with iFR revealing  physiologically significant lesion.    2. Successul AUDRA placement to mid LAD , with IVUS guided post dilation  to ensure no edge issues and adequate stent  expansion.    3. RIKI 3 flow and no chest pain at the end of the procedure.              ASSESSMENT/PLAN:   72 y/o M with PMHx of HTN, HLD, CVA presented to cardiology with c/o CP during exercise which resolves at rest.  Recent CTA chest showing coronary calcifications. Referred for cardiac cath to further evaluate      -VS, labs, diet, activity as per PCI orders  -IV hydration  -Encourage PO fluids  -Maintain on DAPT for 6 months.   -Recommend aggressive medical management for CAD as per ACC/AHA guidelines along with risk factor modification.  -ASA 81mg   -Plavix 75mg  -Lisinopril 10mg  -Toprol XL 25mg  -Lipitor 40mg  -Pt. qualifies for cardiac rehab, educational material provided to patient pt. declines at this time  -Sedation instructions reviewed with patient   -Plan of care D/W pt. and MD  -Discussed therapeutic lifestyle changes to reduce risk factors such as following a cardiac diet, weight loss, maintaining a healthy weight, exercise, smoking cessation, medication compliance, and regular follow-up  with MD to know our numbers (BP, cholesterol, weight, and glucose  -If pt. remains stable possible later this afternoon   -Follow up closely with cardiology within 1 week

## 2023-11-02 NOTE — BRIEF OPERATIVE NOTE - NSICDXBRIEFPOSTOP_GEN_ALL_CORE_FT
POST-OP DIAGNOSIS:  CAD in native artery 02-Nov-2023 09:46:15  Renee Cavazos  S/P coronary artery stent placement 02-Nov-2023 09:46:28  Renee Cavazos

## 2023-11-03 DIAGNOSIS — E78.5 HYPERLIPIDEMIA, UNSPECIFIED: ICD-10-CM

## 2023-11-03 DIAGNOSIS — R94.39 ABNORMAL RESULT OF OTHER CARDIOVASCULAR FUNCTION STUDY: ICD-10-CM

## 2023-11-03 DIAGNOSIS — I25.118 ATHEROSCLEROTIC HEART DISEASE OF NATIVE CORONARY ARTERY WITH OTHER FORMS OF ANGINA PECTORIS: ICD-10-CM

## 2023-11-03 DIAGNOSIS — I10 ESSENTIAL (PRIMARY) HYPERTENSION: ICD-10-CM

## 2023-11-06 ENCOUNTER — APPOINTMENT (OUTPATIENT)
Dept: CARDIOLOGY | Facility: CLINIC | Age: 73
End: 2023-11-06
Payer: MEDICARE

## 2023-11-06 ENCOUNTER — NON-APPOINTMENT (OUTPATIENT)
Age: 73
End: 2023-11-06

## 2023-11-06 VITALS
HEART RATE: 46 BPM | BODY MASS INDEX: 31.55 KG/M2 | WEIGHT: 213 LBS | DIASTOLIC BLOOD PRESSURE: 68 MMHG | OXYGEN SATURATION: 96 % | SYSTOLIC BLOOD PRESSURE: 136 MMHG | HEIGHT: 69 IN

## 2023-11-06 PROBLEM — R73.03 PREDIABETES: Chronic | Status: ACTIVE | Noted: 2023-11-02

## 2023-11-06 PROBLEM — I10 ESSENTIAL (PRIMARY) HYPERTENSION: Chronic | Status: ACTIVE | Noted: 2023-11-02

## 2023-11-06 PROCEDURE — 99214 OFFICE O/P EST MOD 30 MIN: CPT | Mod: 25

## 2023-11-06 PROCEDURE — 93000 ELECTROCARDIOGRAM COMPLETE: CPT

## 2023-11-07 RX ORDER — QUINAPRIL HYDROCHLORIDE 10 MG/1
10 TABLET, FILM COATED ORAL DAILY
Qty: 90 | Refills: 0 | Status: DISCONTINUED | COMMUNITY
Start: 2023-11-06 | End: 2023-11-06

## 2023-11-20 ENCOUNTER — RX RENEWAL (OUTPATIENT)
Age: 73
End: 2023-11-20

## 2023-11-20 PROBLEM — I63.9 CEREBRAL INFARCTION, UNSPECIFIED: Chronic | Status: ACTIVE | Noted: 2023-11-02

## 2023-11-20 PROBLEM — E78.5 HYPERLIPIDEMIA, UNSPECIFIED: Chronic | Status: ACTIVE | Noted: 2023-11-02

## 2023-11-22 ENCOUNTER — APPOINTMENT (OUTPATIENT)
Dept: CARDIOLOGY | Facility: CLINIC | Age: 73
End: 2023-11-22
Payer: MEDICARE

## 2023-11-22 PROCEDURE — 93306 TTE W/DOPPLER COMPLETE: CPT

## 2023-11-22 PROCEDURE — 93880 EXTRACRANIAL BILAT STUDY: CPT

## 2023-12-08 ENCOUNTER — APPOINTMENT (OUTPATIENT)
Dept: UROLOGY | Facility: CLINIC | Age: 73
End: 2023-12-08

## 2023-12-26 ENCOUNTER — RX RENEWAL (OUTPATIENT)
Age: 73
End: 2023-12-26

## 2023-12-26 RX ORDER — SILDENAFIL 100 MG/1
100 TABLET, FILM COATED ORAL
Qty: 6 | Refills: 9 | Status: ACTIVE | COMMUNITY
Start: 2022-12-16 | End: 1900-01-01

## 2024-03-11 ENCOUNTER — APPOINTMENT (OUTPATIENT)
Dept: CARDIOLOGY | Facility: CLINIC | Age: 74
End: 2024-03-11
Payer: MEDICARE

## 2024-03-11 ENCOUNTER — TRANSCRIPTION ENCOUNTER (OUTPATIENT)
Age: 74
End: 2024-03-11

## 2024-03-11 VITALS
HEART RATE: 55 BPM | HEIGHT: 69 IN | BODY MASS INDEX: 31.55 KG/M2 | SYSTOLIC BLOOD PRESSURE: 120 MMHG | WEIGHT: 213 LBS | OXYGEN SATURATION: 98 % | DIASTOLIC BLOOD PRESSURE: 70 MMHG

## 2024-03-11 DIAGNOSIS — I25.810 ATHEROSCLEROSIS OF CORONARY ARTERY BYPASS GRAFT(S) W/OUT ANGINA PECTORIS: ICD-10-CM

## 2024-03-11 PROCEDURE — 93000 ELECTROCARDIOGRAM COMPLETE: CPT

## 2024-03-11 PROCEDURE — 99214 OFFICE O/P EST MOD 30 MIN: CPT | Mod: 25

## 2024-03-11 NOTE — REVIEW OF SYSTEMS
[Negative] : Heme/Lymph [SOB] : no shortness of breath [Chest Discomfort] : chest discomfort [Dyspnea on exertion] : not dyspnea during exertion [Lower Ext Edema] : no extremity edema [Leg Claudication] : no intermittent leg claudication [Orthopnea] : no orthopnea [Palpitations] : no palpitations [PND] : no PND [Syncope] : no syncope

## 2024-03-11 NOTE — HISTORY OF PRESENT ILLNESS
[FreeTextEntry1] : Pt is a 75 y/o M who PMH CAD s/p AUDRA to mLAD 11/2023 (chest pressure with exertion), HTN, HLD, CVA 2019 (SBU x7 days), preDM, BMI 31.    Today he tells me that he has started walking again about a month ago and started feeling some chest discomfort but now with his walks he feels well and is in facto pushing himself harder, has not had any further episodes.  He is compliant with his medications  CTA chest 09/2023 no PE, small pericardial effusion, coronary artery calcifications cardiac cath 11/2023 sev mLAD s/p AUDAR TTE 06/2016 mildly decreased LV function, trace MR/TR  Family hx: no cardiac disease Smoking status: quit about age 48's social ETOH no drug use Current exercise: 5x/week cardio and weight training  Daily water intake: 1 glass Daily caffeine intake: 3 cups coffee, 2 cans soda OTC medications: none Previous hospitalizations: back surgery x2 - no problems with anesthesia

## 2024-03-11 NOTE — DISCUSSION/SUMMARY
[EKG obtained to assist in diagnosis and management of assessed problem(s)] : EKG obtained to assist in diagnosis and management of assessed problem(s) [FreeTextEntry1] : Pt is a 75 y/o M who PMH CAD s/p AUDRA to mLAD 11/2023 (chest pressure with exertion), HTN, HLD, CVA 2019 (SBU x7 days), preDM, BMI 31.    CAD: c/w ASA c/w plavix c/w lipitor, goal LDL <70 repeat labs Advised pt to go to the nearest ED if symptoms persist or worsen   HTN: well controlled c/w amlodipine and lisinopril Discussed the long-term health risks of uncontrolled BP.  Advised low salt diet, regular exercise, maintaining ideal weight. Encouraged pt to check BP at home and keep journal   HLD: c/w atorvastatin goal LDL <70 Advised lifestyle modifications   Pt will return in 6 mnths or sooner as needed but I encouraged communication via phone or portal if necessary.  The described plan was discussed with the pt.  All questions and concerns were addressed to the best of my knowledge.

## 2024-03-11 NOTE — PHYSICAL EXAM
[Well Developed] : well developed [Well Nourished] : well nourished [No Acute Distress] : no acute distress [Normal Conjunctiva] : normal conjunctiva [Normal Venous Pressure] : normal venous pressure [Normal S1, S2] : normal S1, S2 [No Carotid Bruit] : no carotid bruit [No Murmur] : no murmur [No Rub] : no rub [No Gallop] : no gallop [Clear Lung Fields] : clear lung fields [Good Air Entry] : good air entry [No Respiratory Distress] : no respiratory distress  [Soft] : abdomen soft [Non Tender] : non-tender [No Masses/organomegaly] : no masses/organomegaly [Normal Bowel Sounds] : normal bowel sounds [No Edema] : no edema [Normal Gait] : normal gait [No Cyanosis] : no cyanosis [No Clubbing] : no clubbing [No Varicosities] : no varicosities [No Rash] : no rash [No Skin Lesions] : no skin lesions [Moves all extremities] : moves all extremities [No Focal Deficits] : no focal deficits [Normal Speech] : normal speech [Alert and Oriented] : alert and oriented [Normal memory] : normal memory

## 2024-03-15 DIAGNOSIS — R21 RASH AND OTHER NONSPECIFIC SKIN ERUPTION: ICD-10-CM

## 2024-03-18 ENCOUNTER — APPOINTMENT (OUTPATIENT)
Dept: DERMATOLOGY | Facility: CLINIC | Age: 74
End: 2024-03-18
Payer: MEDICARE

## 2024-03-18 DIAGNOSIS — L82.1 OTHER SEBORRHEIC KERATOSIS: ICD-10-CM

## 2024-03-18 PROCEDURE — 99203 OFFICE O/P NEW LOW 30 MIN: CPT

## 2024-03-18 RX ORDER — FLUOROURACIL 50 MG/G
5 CREAM TOPICAL
Qty: 1 | Refills: 2 | Status: ACTIVE | COMMUNITY
Start: 2024-03-18 | End: 1900-01-01

## 2024-03-18 NOTE — PLAN
[TextEntry] : Patient advised to wear a hat when outside Reassurance about the seborrheic keratosis Start 5-fluorouracil cream to forehead twice daily 1 day/week  Return 5 weeks  [employer] student, served as chaperone and was present for the entire skin exam.

## 2024-03-18 NOTE — HISTORY OF PRESENT ILLNESS
[de-identified] : First visit for 74-year-old white male with a 2-month history of a mildly pruritic "bumps" on the upper forehead.  Starting to resolve spontaneously.  Patient also complains of a lesion on the back.  No history of skin cancer. [FreeTextEntry1] : Bumps on face and growth on back.

## 2024-03-18 NOTE — ASSESSMENT
[FreeTextEntry1] : Actinic keratoses on anterior scalp and upper forehead Seborrheic keratosis on back

## 2024-03-18 NOTE — CONSULT LETTER
[Dear  ___] : Dear  [unfilled], [Consult Letter:] : I had the pleasure of evaluating your patient, [unfilled]. [Consult Closing:] : Thank you very much for allowing me to participate in the care of this patient.  If you have any questions, please do not hesitate to contact me. [Sincerely,] : Sincerely, [FreeTextEntry2] : Jean-Paul Velázquez MD [FreeTextEntry1] : He has multiple actinic keratoses on the anterior scalp and upper forehead.  I am treating these with 5-fluorouracil cream twice daily 1 day/week.  Please see attached chart note for further details. [FreeTextEntry3] : Prasanna Gama MD [address] [facility] [email or web address] [address] [place] [address] Tel [phone]) Fax ([phone]) Private line ([phone])

## 2024-03-18 NOTE — PHYSICAL EXAM
[Alert] : alert [Oriented x 3] : ~L oriented x 3 [Well Nourished] : well nourished [FreeTextEntry3] : Patient declines full skin exam today  Type II skin  Upper forehead and anterior scalp: Multiple pink scaly macules, few are mildly hyperkeratotic Left lower back: 14 x 8 mm black verrucous plaque

## 2024-04-05 ENCOUNTER — APPOINTMENT (OUTPATIENT)
Dept: FAMILY MEDICINE | Facility: CLINIC | Age: 74
End: 2024-04-05
Payer: MEDICARE

## 2024-04-05 VITALS
BODY MASS INDEX: 32.29 KG/M2 | HEART RATE: 60 BPM | DIASTOLIC BLOOD PRESSURE: 78 MMHG | WEIGHT: 218 LBS | TEMPERATURE: 97.7 F | OXYGEN SATURATION: 97 % | HEIGHT: 69 IN | SYSTOLIC BLOOD PRESSURE: 126 MMHG

## 2024-04-05 DIAGNOSIS — Z98.61 CORONARY ANGIOPLASTY STATUS: ICD-10-CM

## 2024-04-05 DIAGNOSIS — I10 ESSENTIAL (PRIMARY) HYPERTENSION: ICD-10-CM

## 2024-04-05 DIAGNOSIS — I20.9 ANGINA PECTORIS, UNSPECIFIED: ICD-10-CM

## 2024-04-05 DIAGNOSIS — C61 MALIGNANT NEOPLASM OF PROSTATE: ICD-10-CM

## 2024-04-05 DIAGNOSIS — I20.89 OTHER FORMS OF ANGINA PECTORIS: ICD-10-CM

## 2024-04-05 DIAGNOSIS — I63.9 CEREBRAL INFARCTION, UNSPECIFIED: ICD-10-CM

## 2024-04-05 DIAGNOSIS — E78.5 HYPERLIPIDEMIA, UNSPECIFIED: ICD-10-CM

## 2024-04-05 PROCEDURE — 99213 OFFICE O/P EST LOW 20 MIN: CPT

## 2024-04-05 NOTE — HISTORY OF PRESENT ILLNESS
[de-identified] : Status post PTCA doing well no chest pain with exercise  Receiving 5-FU for actinic keratosis reassured that erythema will resolve  Status post prostate cancer will check PSA  History of cerebral infarction no focal neurological symptoms  Hypertension controlled on lisinopril and amlodipine and metoprolol  Colonoscopy 2021 mother had colon cancer colonoscopy was completely normal except for diverticulosis repeat colonoscopy in 1 year

## 2024-04-06 LAB
ALBUMIN SERPL ELPH-MCNC: 4.4 G/DL
ALP BLD-CCNC: 102 U/L
ALT SERPL-CCNC: 26 U/L
ANION GAP SERPL CALC-SCNC: 13 MMOL/L
AST SERPL-CCNC: 19 U/L
BASOPHILS # BLD AUTO: 0.02 K/UL
BASOPHILS NFR BLD AUTO: 0.4 %
BILIRUB SERPL-MCNC: 0.3 MG/DL
BUN SERPL-MCNC: 21 MG/DL
CALCIUM SERPL-MCNC: 9.3 MG/DL
CHLORIDE SERPL-SCNC: 108 MMOL/L
CHOLEST SERPL-MCNC: 134 MG/DL
CO2 SERPL-SCNC: 24 MMOL/L
CREAT SERPL-MCNC: 0.82 MG/DL
EGFR: 91 ML/MIN/1.73M2
EOSINOPHIL # BLD AUTO: 0.14 K/UL
EOSINOPHIL NFR BLD AUTO: 2.9 %
ESTIMATED AVERAGE GLUCOSE: 117 MG/DL
GLUCOSE SERPL-MCNC: 91 MG/DL
HBA1C MFR BLD HPLC: 5.7 %
HCT VFR BLD CALC: 43.3 %
HDLC SERPL-MCNC: 49 MG/DL
HGB BLD-MCNC: 14.9 G/DL
IMM GRANULOCYTES NFR BLD AUTO: 0.2 %
LDLC SERPL CALC-MCNC: 62 MG/DL
LYMPHOCYTES # BLD AUTO: 2.03 K/UL
LYMPHOCYTES NFR BLD AUTO: 42 %
MAGNESIUM SERPL-MCNC: 1.9 MG/DL
MAN DIFF?: NORMAL
MCHC RBC-ENTMCNC: 30.5 PG
MCHC RBC-ENTMCNC: 34.4 GM/DL
MCV RBC AUTO: 88.7 FL
MONOCYTES # BLD AUTO: 0.39 K/UL
MONOCYTES NFR BLD AUTO: 8.1 %
NEUTROPHILS # BLD AUTO: 2.24 K/UL
NEUTROPHILS NFR BLD AUTO: 46.4 %
NONHDLC SERPL-MCNC: 85 MG/DL
PLATELET # BLD AUTO: 158 K/UL
POTASSIUM SERPL-SCNC: 4.2 MMOL/L
PROT SERPL-MCNC: 6.9 G/DL
PSA SERPL-MCNC: 0.61 NG/ML
RBC # BLD: 4.88 M/UL
RBC # FLD: 14.4 %
SODIUM SERPL-SCNC: 145 MMOL/L
TRIGL SERPL-MCNC: 135 MG/DL
WBC # FLD AUTO: 4.83 K/UL

## 2024-05-01 ENCOUNTER — APPOINTMENT (OUTPATIENT)
Dept: DERMATOLOGY | Facility: CLINIC | Age: 74
End: 2024-05-01
Payer: MEDICARE

## 2024-05-01 DIAGNOSIS — L57.0 ACTINIC KERATOSIS: ICD-10-CM

## 2024-05-01 PROCEDURE — 99213 OFFICE O/P EST LOW 20 MIN: CPT

## 2024-05-01 RX ORDER — TRIAMCINOLONE ACETONIDE 1 MG/G
0.1 OINTMENT TOPICAL
Qty: 1 | Refills: 1 | Status: ACTIVE | COMMUNITY
Start: 2024-05-01 | End: 1900-01-01

## 2024-05-01 NOTE — HISTORY OF PRESENT ILLNESS
[FreeTextEntry1] : Actinic keratoses [de-identified] : Follow-up visit for 74-year-old white male first seen by me on March 18, 2024, with actinic keratoses on the anterior scalp and upper forehead.  Treated with: Start 5-fluorouracil cream to forehead twice daily 1 day/week. Patient has used the medication twice a day every day.  Complains of a painful rash on the forehead

## 2024-05-01 NOTE — PLAN
[TextEntry] : Photo taken Hold 5-fluorouracil cream Start triamcinolone ointment 0.1% to forehead twice daily until improved Patient advised to wear a hat when outside  Return 3 months  [employer] student, served as chaperone and was present for the entire skin exam.

## 2024-05-01 NOTE — PHYSICAL EXAM
[Alert] : alert [Oriented x 3] : ~L oriented x 3 [Well Nourished] : well nourished [FreeTextEntry3] : Upper forehead: Diffuse erythema with mild to moderate thin crusting

## 2024-05-09 ENCOUNTER — APPOINTMENT (OUTPATIENT)
Dept: FAMILY MEDICINE | Facility: CLINIC | Age: 74
End: 2024-05-09
Payer: MEDICARE

## 2024-05-09 ENCOUNTER — NON-APPOINTMENT (OUTPATIENT)
Age: 74
End: 2024-05-09

## 2024-05-09 VITALS
TEMPERATURE: 97.7 F | HEART RATE: 54 BPM | SYSTOLIC BLOOD PRESSURE: 122 MMHG | WEIGHT: 214 LBS | HEIGHT: 69 IN | DIASTOLIC BLOOD PRESSURE: 68 MMHG | BODY MASS INDEX: 31.7 KG/M2 | OXYGEN SATURATION: 98 %

## 2024-05-09 DIAGNOSIS — K22.4 DYSKINESIA OF ESOPHAGUS: ICD-10-CM

## 2024-05-09 PROCEDURE — 99214 OFFICE O/P EST MOD 30 MIN: CPT

## 2024-05-09 NOTE — REVIEW OF SYSTEMS
[Abdominal Pain] : no abdominal pain [Nausea] : no nausea [Heartburn] : no heartburn [Melena] : no melena [Negative] : Respiratory

## 2024-05-09 NOTE — HISTORY OF PRESENT ILLNESS
[FreeTextEntry8] : Several brief episodes at random in the past 5 days of feeling of epigastric/lower retrosternal pressure.  No shortness of breath no diaphoresis no feeling of doom resolve spontaneously.  Improves with ambulation.  Exercising vigorously without difficulty.  Has not happened in the past couple of days.  Does not feel like the angina he had prior to his stents.  There is no lower esophageal dysphagia no early satiety no change in bowel movements no loss of appetite.  Patient had an esophagram in 2015 for difficulty swallowing which was unremarkable  Several episodes of brief lower esophageal spasm if persists may need EGD/repeat esophagram

## 2024-05-09 NOTE — ASSESSMENT
[FreeTextEntry1] : Probable esophageal spasm will observe workup if worsens or persists.  Red flag symptoms discussed

## 2024-05-22 ENCOUNTER — OFFICE (OUTPATIENT)
Dept: URBAN - METROPOLITAN AREA CLINIC 12 | Facility: CLINIC | Age: 74
Setting detail: OPHTHALMOLOGY
End: 2024-05-22
Payer: MEDICARE

## 2024-05-22 DIAGNOSIS — H35.373: ICD-10-CM

## 2024-05-22 DIAGNOSIS — H35.3131: ICD-10-CM

## 2024-05-22 DIAGNOSIS — H16.223: ICD-10-CM

## 2024-05-22 DIAGNOSIS — H57.11: ICD-10-CM

## 2024-05-22 DIAGNOSIS — D31.30: ICD-10-CM

## 2024-05-22 DIAGNOSIS — Z96.1: ICD-10-CM

## 2024-05-22 DIAGNOSIS — H43.393: ICD-10-CM

## 2024-05-22 DIAGNOSIS — H43.812: ICD-10-CM

## 2024-05-22 DIAGNOSIS — H01.004: ICD-10-CM

## 2024-05-22 DIAGNOSIS — H01.001: ICD-10-CM

## 2024-05-22 PROCEDURE — 92250 FUNDUS PHOTOGRAPHY W/I&R: CPT | Performed by: STUDENT IN AN ORGANIZED HEALTH CARE EDUCATION/TRAINING PROGRAM

## 2024-05-22 PROCEDURE — 92014 COMPRE OPH EXAM EST PT 1/>: CPT | Performed by: STUDENT IN AN ORGANIZED HEALTH CARE EDUCATION/TRAINING PROGRAM

## 2024-05-22 ASSESSMENT — LID EXAM ASSESSMENTS
OS_BLEPHARITIS: 1+
OD_BLEPHARITIS: 1+

## 2024-05-22 ASSESSMENT — CONFRONTATIONAL VISUAL FIELD TEST (CVF)
OS_FINDINGS: FULL
OD_FINDINGS: FULL

## 2024-06-13 RX ORDER — CLOPIDOGREL BISULFATE 75 MG/1
75 TABLET, FILM COATED ORAL
Qty: 90 | Refills: 1 | Status: ACTIVE | COMMUNITY
Start: 2023-11-02

## 2024-06-14 ENCOUNTER — RX RENEWAL (OUTPATIENT)
Age: 74
End: 2024-06-14

## 2024-06-14 RX ORDER — METOPROLOL TARTRATE 25 MG/1
25 TABLET, FILM COATED ORAL TWICE DAILY
Qty: 180 | Refills: 3 | Status: ACTIVE | COMMUNITY
Start: 2023-10-09 | End: 1900-01-01

## 2024-07-30 ENCOUNTER — APPOINTMENT (OUTPATIENT)
Dept: FAMILY MEDICINE | Facility: CLINIC | Age: 74
End: 2024-07-30
Payer: MEDICARE

## 2024-07-30 VITALS
WEIGHT: 205.13 LBS | TEMPERATURE: 98 F | HEIGHT: 69 IN | DIASTOLIC BLOOD PRESSURE: 78 MMHG | HEART RATE: 66 BPM | SYSTOLIC BLOOD PRESSURE: 114 MMHG | BODY MASS INDEX: 30.38 KG/M2 | OXYGEN SATURATION: 95 %

## 2024-07-30 DIAGNOSIS — J18.9 PNEUMONIA, UNSPECIFIED ORGANISM: ICD-10-CM

## 2024-07-30 PROCEDURE — G2211 COMPLEX E/M VISIT ADD ON: CPT

## 2024-07-30 PROCEDURE — 99213 OFFICE O/P EST LOW 20 MIN: CPT

## 2024-07-30 RX ORDER — DOXYCYCLINE HYCLATE 100 MG/1
100 TABLET ORAL
Qty: 14 | Refills: 0 | Status: ACTIVE | COMMUNITY
Start: 2024-07-30 | End: 1900-01-01

## 2024-07-30 NOTE — PHYSICAL EXAM
[Normal] : no acute distress, well nourished, well developed and well-appearing [de-identified] : wheezing in all lung fields

## 2024-07-30 NOTE — HISTORY OF PRESENT ILLNESS
[FreeTextEntry8] : PT presenting for cough, congestion wheezing x 4 days Tmax of 101 last night today no medications feels winded and not like himself

## 2024-07-30 NOTE — ASSESSMENT
[FreeTextEntry1] : Pt evaluated and symptoms consistent with CAP discussed viral vs bacterial infections and discussed OTC treatment options including rest, fluids, hydration, motrin/tylenol PRN  Use stream inhalation and neti pot PRN  if no improvement in 1 week f/u  antibiotics sent per above, counseled on antibiotic resistance

## 2024-07-30 NOTE — REVIEW OF SYSTEMS
[Fever] : fever [Chills] : chills [Nasal Discharge] : nasal discharge [Sore Throat] : sore throat [Wheezing] : wheezing [Cough] : cough

## 2024-08-13 ENCOUNTER — RX RENEWAL (OUTPATIENT)
Age: 74
End: 2024-08-13

## 2024-08-14 ENCOUNTER — RX RENEWAL (OUTPATIENT)
Age: 74
End: 2024-08-14

## 2024-09-06 ENCOUNTER — APPOINTMENT (OUTPATIENT)
Dept: FAMILY MEDICINE | Facility: CLINIC | Age: 74
End: 2024-09-06
Payer: MEDICARE

## 2024-09-06 VITALS
BODY MASS INDEX: 30.21 KG/M2 | DIASTOLIC BLOOD PRESSURE: 68 MMHG | WEIGHT: 204 LBS | HEIGHT: 69 IN | OXYGEN SATURATION: 94 % | HEART RATE: 53 BPM | TEMPERATURE: 97.5 F | SYSTOLIC BLOOD PRESSURE: 122 MMHG

## 2024-09-06 DIAGNOSIS — M75.41 IMPINGEMENT SYNDROME OF RIGHT SHOULDER: ICD-10-CM

## 2024-09-06 PROCEDURE — 99213 OFFICE O/P EST LOW 20 MIN: CPT | Mod: 25

## 2024-09-06 PROCEDURE — 96372 THER/PROPH/DIAG INJ SC/IM: CPT

## 2024-09-06 RX ADMIN — METHYLPREDNISOLONE ACETATE 0 MG/ML: 40 INJECTION, SUSPENSION INTRA-ARTICULAR; INTRALESIONAL; INTRAMUSCULAR; SOFT TISSUE at 00:00

## 2024-09-06 NOTE — HISTORY OF PRESENT ILLNESS
[FreeTextEntry8] : Right shoulder pain and weakness for several weeks.  History of rotator cuff impingement on the left has had surgery.  Good range of motion of neck.  Patient practiced martial arts for many years has repetitive stress  Pain with internal rotation of shoulder and with use of supraspinatus muscle.  Deep tendon reflexes are symmetrical motor exam is normal  Probable rotator cuff impingement Betadine x 3 posterior approach 40 mg Medrol 1/2 cc Marcaine injected easily with pain relief.  If symptoms persist MRI of shoulder and physical therapy.  Home physical therapy discussed

## 2024-09-09 RX ORDER — METHYLPRED ACET/NACL,ISO-OS/PF 40 MG/ML
40 VIAL (ML) INJECTION
Qty: 0 | Refills: 0 | Status: COMPLETED | OUTPATIENT
Start: 2024-09-06

## 2024-09-11 ENCOUNTER — APPOINTMENT (OUTPATIENT)
Dept: CARDIOLOGY | Facility: CLINIC | Age: 74
End: 2024-09-11
Payer: MEDICARE

## 2024-09-11 ENCOUNTER — NON-APPOINTMENT (OUTPATIENT)
Age: 74
End: 2024-09-11

## 2024-09-11 VITALS
HEIGHT: 69 IN | BODY MASS INDEX: 30.36 KG/M2 | SYSTOLIC BLOOD PRESSURE: 120 MMHG | HEART RATE: 82 BPM | WEIGHT: 205 LBS | DIASTOLIC BLOOD PRESSURE: 60 MMHG | OXYGEN SATURATION: 98 %

## 2024-09-11 DIAGNOSIS — I25.810 ATHEROSCLEROSIS OF CORONARY ARTERY BYPASS GRAFT(S) W/OUT ANGINA PECTORIS: ICD-10-CM

## 2024-09-11 DIAGNOSIS — I10 ESSENTIAL (PRIMARY) HYPERTENSION: ICD-10-CM

## 2024-09-11 DIAGNOSIS — E78.5 HYPERLIPIDEMIA, UNSPECIFIED: ICD-10-CM

## 2024-09-11 PROCEDURE — 99214 OFFICE O/P EST MOD 30 MIN: CPT | Mod: 25

## 2024-09-11 PROCEDURE — 93000 ELECTROCARDIOGRAM COMPLETE: CPT

## 2024-09-11 NOTE — DISCUSSION/SUMMARY
[EKG obtained to assist in diagnosis and management of assessed problem(s)] : EKG obtained to assist in diagnosis and management of assessed problem(s) [FreeTextEntry1] : Pt is a 73 y/o M who PMH CAD s/p AUDRA to mLAD 11/2023 (chest pressure with exertion), HTN, HLD, CVA 2019 ([facility] x7 days), preDM, BMI 31.    CAD: c/w ASA c/w plavix - will stop this  c/w lipitor, goal LDL <70 repeat labs  HTN: well controlled c/w amlodipine 5mg qd c/w lisinopril 10mg qd c/w metoprolol tartrate 25mg bid Discussed the long-term health risks of uncontrolled BP.  Advised low salt diet, regular exercise, maintaining ideal weight. Encouraged pt to check BP at home and keep journal   HLD: c/w atorvastatin goal LDL <70 Advised lifestyle modifications   preDM: Advise lifestyle modifications   Pt will return in 6 mnths or sooner as needed but I encouraged communication via phone or portal if necessary.  The described plan was discussed with the pt.  All questions and concerns were addressed to the best of my knowledge.

## 2024-09-11 NOTE — HISTORY OF PRESENT ILLNESS
[FreeTextEntry1] : Pt is a 73 y/o M who PMH CAD s/p AUDRA to mLAD 11/2023 (chest pressure with exertion), HTN, HLD, CVA 2019 ([facility] x7 days), preDM, BMI 31.    Pt reports feeling well and has no active cardiac complaints - denies CP, SOB, palpitations, dizziness, syncope, edema, orthopnea, PND, orthopnea.  No exertional symptoms. No new hospitalizations, emergency room/urgent care visits, new medical diagnoses, new medications, surgery, or cardiac testing since last OV.  He is compliant with his medications  CTA chest 09/2023 no PE, small pericardial effusion, coronary artery calcifications cardiac cath 11/2023 sev mLAD s/p AUDRA TTE 06/2016 mildly decreased LV function, trace MR/TR TTE 11/2023 EF 61%, trace MR/TR  Family hx: no cardiac disease Smoking status: quit about age 48's social ETOH no drug use Current exercise: 5x/week cardio and weight training  Daily water intake: 1 glass Daily caffeine intake: 3 cups coffee, 2 cans soda OTC medications: none Previous hospitalizations: back surgery x2 - no problems with anesthesia

## 2024-09-25 ENCOUNTER — NON-APPOINTMENT (OUTPATIENT)
Age: 74
End: 2024-09-25

## 2024-10-11 ENCOUNTER — RX RENEWAL (OUTPATIENT)
Age: 74
End: 2024-10-11

## 2024-11-11 ENCOUNTER — APPOINTMENT (OUTPATIENT)
Dept: FAMILY MEDICINE | Facility: CLINIC | Age: 74
End: 2024-11-11
Payer: MEDICARE

## 2024-11-11 ENCOUNTER — RX RENEWAL (OUTPATIENT)
Age: 74
End: 2024-11-11

## 2024-11-11 VITALS
WEIGHT: 210 LBS | SYSTOLIC BLOOD PRESSURE: 122 MMHG | HEIGHT: 69 IN | BODY MASS INDEX: 31.1 KG/M2 | TEMPERATURE: 97.6 F | DIASTOLIC BLOOD PRESSURE: 62 MMHG | OXYGEN SATURATION: 96 % | HEART RATE: 65 BPM

## 2024-11-11 VITALS — HEART RATE: 60 BPM | SYSTOLIC BLOOD PRESSURE: 126 MMHG | DIASTOLIC BLOOD PRESSURE: 86 MMHG | RESPIRATION RATE: 16 BRPM

## 2024-11-11 DIAGNOSIS — T78.40XA ALLERGY, UNSPECIFIED, INITIAL ENCOUNTER: ICD-10-CM

## 2024-11-11 DIAGNOSIS — E78.5 HYPERLIPIDEMIA, UNSPECIFIED: ICD-10-CM

## 2024-11-11 DIAGNOSIS — I10 ESSENTIAL (PRIMARY) HYPERTENSION: ICD-10-CM

## 2024-11-11 PROCEDURE — 99214 OFFICE O/P EST MOD 30 MIN: CPT

## 2024-11-11 RX ORDER — BETAMETHASONE DIPROPIONATE 0.5 MG/G
0.05 CREAM, AUGMENTED TOPICAL TWICE DAILY
Qty: 1 | Refills: 0 | Status: ACTIVE | COMMUNITY
Start: 2024-11-11 | End: 1900-01-01

## 2024-11-11 RX ORDER — HYDROXYZINE HYDROCHLORIDE 10 MG/1
10 TABLET ORAL
Qty: 40 | Refills: 1 | Status: ACTIVE | COMMUNITY
Start: 2024-11-11 | End: 1900-01-01

## 2024-11-11 RX ORDER — PREDNISONE 20 MG/1
20 TABLET ORAL DAILY
Qty: 12 | Refills: 0 | Status: ACTIVE | COMMUNITY
Start: 2024-11-11 | End: 1900-01-01

## 2024-12-02 ENCOUNTER — APPOINTMENT (OUTPATIENT)
Dept: FAMILY MEDICINE | Facility: CLINIC | Age: 74
End: 2024-12-02
Payer: MEDICARE

## 2024-12-02 VITALS
HEART RATE: 54 BPM | HEIGHT: 69 IN | WEIGHT: 211 LBS | SYSTOLIC BLOOD PRESSURE: 124 MMHG | BODY MASS INDEX: 31.25 KG/M2 | DIASTOLIC BLOOD PRESSURE: 74 MMHG | OXYGEN SATURATION: 99 % | TEMPERATURE: 97.6 F

## 2024-12-02 DIAGNOSIS — R21 RASH AND OTHER NONSPECIFIC SKIN ERUPTION: ICD-10-CM

## 2024-12-02 DIAGNOSIS — T78.40XA ALLERGY, UNSPECIFIED, INITIAL ENCOUNTER: ICD-10-CM

## 2024-12-02 PROCEDURE — 99214 OFFICE O/P EST MOD 30 MIN: CPT

## 2024-12-02 RX ORDER — CLOBETASOL PROPIONATE 0.5 MG/G
0.05 OINTMENT TOPICAL TWICE DAILY
Qty: 1 | Refills: 1 | Status: ACTIVE | COMMUNITY
Start: 2024-12-02 | End: 1900-01-01

## 2024-12-03 ENCOUNTER — APPOINTMENT (OUTPATIENT)
Dept: DERMATOLOGY | Facility: CLINIC | Age: 74
End: 2024-12-03
Payer: MEDICARE

## 2024-12-03 PROCEDURE — 99214 OFFICE O/P EST MOD 30 MIN: CPT

## 2024-12-12 ENCOUNTER — NON-APPOINTMENT (OUTPATIENT)
Age: 74
End: 2024-12-12

## 2024-12-16 ENCOUNTER — RX RENEWAL (OUTPATIENT)
Age: 74
End: 2024-12-16

## 2024-12-27 ENCOUNTER — RX RENEWAL (OUTPATIENT)
Age: 74
End: 2024-12-27

## 2024-12-28 ENCOUNTER — RX RENEWAL (OUTPATIENT)
Age: 74
End: 2024-12-28

## 2025-01-10 ENCOUNTER — RX RENEWAL (OUTPATIENT)
Age: 75
End: 2025-01-10

## 2025-02-10 NOTE — BRIEF OPERATIVE NOTE - ESTIMATED BLOOD LOSS
Labs now   US thyroid in Nov 2025   Follow up after the US    Let us know if you have change in symptoms/ change in voice/choking on food /larger thyroid nodules    5

## 2025-02-19 ENCOUNTER — RX RENEWAL (OUTPATIENT)
Age: 75
End: 2025-02-19

## 2025-03-03 ENCOUNTER — TRANSCRIPTION ENCOUNTER (OUTPATIENT)
Age: 75
End: 2025-03-03

## 2025-03-18 ENCOUNTER — APPOINTMENT (OUTPATIENT)
Dept: CARDIOLOGY | Facility: CLINIC | Age: 75
End: 2025-03-18
Payer: MEDICARE

## 2025-03-18 ENCOUNTER — NON-APPOINTMENT (OUTPATIENT)
Age: 75
End: 2025-03-18

## 2025-03-18 VITALS
DIASTOLIC BLOOD PRESSURE: 78 MMHG | WEIGHT: 209 LBS | OXYGEN SATURATION: 96 % | BODY MASS INDEX: 30.96 KG/M2 | SYSTOLIC BLOOD PRESSURE: 136 MMHG | HEART RATE: 102 BPM | HEIGHT: 69 IN

## 2025-03-18 DIAGNOSIS — E78.5 HYPERLIPIDEMIA, UNSPECIFIED: ICD-10-CM

## 2025-03-18 DIAGNOSIS — I10 ESSENTIAL (PRIMARY) HYPERTENSION: ICD-10-CM

## 2025-03-18 DIAGNOSIS — I25.810 ATHEROSCLEROSIS OF CORONARY ARTERY BYPASS GRAFT(S) W/OUT ANGINA PECTORIS: ICD-10-CM

## 2025-03-18 PROCEDURE — 99214 OFFICE O/P EST MOD 30 MIN: CPT | Mod: 25

## 2025-03-18 PROCEDURE — 93000 ELECTROCARDIOGRAM COMPLETE: CPT

## 2025-03-21 ENCOUNTER — APPOINTMENT (OUTPATIENT)
Dept: FAMILY MEDICINE | Facility: CLINIC | Age: 75
End: 2025-03-21
Payer: MEDICARE

## 2025-03-21 ENCOUNTER — APPOINTMENT (OUTPATIENT)
Dept: CARDIOLOGY | Facility: CLINIC | Age: 75
End: 2025-03-21

## 2025-03-21 VITALS
SYSTOLIC BLOOD PRESSURE: 134 MMHG | OXYGEN SATURATION: 97 % | HEART RATE: 89 BPM | BODY MASS INDEX: 31.25 KG/M2 | HEIGHT: 69 IN | TEMPERATURE: 97 F | WEIGHT: 211 LBS | DIASTOLIC BLOOD PRESSURE: 80 MMHG

## 2025-03-21 DIAGNOSIS — Z98.61 CORONARY ANGIOPLASTY STATUS: ICD-10-CM

## 2025-03-21 DIAGNOSIS — I20.9 ANGINA PECTORIS, UNSPECIFIED: ICD-10-CM

## 2025-03-21 DIAGNOSIS — I63.9 CEREBRAL INFARCTION, UNSPECIFIED: ICD-10-CM

## 2025-03-21 DIAGNOSIS — C61 MALIGNANT NEOPLASM OF PROSTATE: ICD-10-CM

## 2025-03-21 DIAGNOSIS — C67.9 MALIGNANT NEOPLASM OF BLADDER, UNSPECIFIED: ICD-10-CM

## 2025-03-21 PROCEDURE — 99213 OFFICE O/P EST LOW 20 MIN: CPT

## 2025-03-22 LAB
ALBUMIN SERPL ELPH-MCNC: 4.3 G/DL
ALP BLD-CCNC: 123 U/L
ALT SERPL-CCNC: 25 U/L
ANION GAP SERPL CALC-SCNC: 11 MMOL/L
AST SERPL-CCNC: 20 U/L
BASOPHILS # BLD AUTO: 0.02 K/UL
BASOPHILS NFR BLD AUTO: 0.4 %
BILIRUB SERPL-MCNC: 0.5 MG/DL
BUN SERPL-MCNC: 16 MG/DL
CALCIUM SERPL-MCNC: 9.5 MG/DL
CHLORIDE SERPL-SCNC: 105 MMOL/L
CHOLEST SERPL-MCNC: 133 MG/DL
CO2 SERPL-SCNC: 26 MMOL/L
CREAT SERPL-MCNC: 0.76 MG/DL
EGFRCR SERPLBLD CKD-EPI 2021: 93 ML/MIN/1.73M2
EOSINOPHIL # BLD AUTO: 0.16 K/UL
EOSINOPHIL NFR BLD AUTO: 3.5 %
ESTIMATED AVERAGE GLUCOSE: 117 MG/DL
GLUCOSE SERPL-MCNC: 129 MG/DL
HBA1C MFR BLD HPLC: 5.7 %
HCT VFR BLD CALC: 41 %
HDLC SERPL-MCNC: 50 MG/DL
HGB BLD-MCNC: 14.1 G/DL
IMM GRANULOCYTES NFR BLD AUTO: 0.4 %
LDLC SERPL-MCNC: 65 MG/DL
LYMPHOCYTES # BLD AUTO: 1.44 K/UL
LYMPHOCYTES NFR BLD AUTO: 31.6 %
MAN DIFF?: NORMAL
MCHC RBC-ENTMCNC: 30.4 PG
MCHC RBC-ENTMCNC: 34.4 G/DL
MCV RBC AUTO: 88.4 FL
MONOCYTES # BLD AUTO: 0.38 K/UL
MONOCYTES NFR BLD AUTO: 8.3 %
NEUTROPHILS # BLD AUTO: 2.54 K/UL
NEUTROPHILS NFR BLD AUTO: 55.8 %
NONHDLC SERPL-MCNC: 84 MG/DL
PLATELET # BLD AUTO: 201 K/UL
POTASSIUM SERPL-SCNC: 4.5 MMOL/L
PROT SERPL-MCNC: 6.9 G/DL
RBC # BLD: 4.64 M/UL
RBC # FLD: 14.1 %
SODIUM SERPL-SCNC: 143 MMOL/L
TRIGL SERPL-MCNC: 99 MG/DL
TSH SERPL-ACNC: 0.5 UIU/ML
WBC # FLD AUTO: 4.56 K/UL

## 2025-03-26 ENCOUNTER — OFFICE (OUTPATIENT)
Dept: URBAN - METROPOLITAN AREA CLINIC 115 | Facility: CLINIC | Age: 75
Setting detail: OPHTHALMOLOGY
End: 2025-03-26
Payer: MEDICARE

## 2025-03-26 DIAGNOSIS — H02.011: ICD-10-CM

## 2025-03-26 PROBLEM — H01.00 BLEPHARITIS; RIGHT UPPER LID, LEFT UPPER LID: Status: ACTIVE | Noted: 2025-03-26

## 2025-03-26 PROCEDURE — 99213 OFFICE O/P EST LOW 20 MIN: CPT | Performed by: OPTOMETRIST

## 2025-03-26 ASSESSMENT — REFRACTION_AUTOREFRACTION
OD_SPHERE: +0.75
OS_AXIS: 070
OD_AXIS: 150
OD_CYLINDER: -0.50
OS_CYLINDER: -0.25
OS_SPHERE: +0.75

## 2025-03-26 ASSESSMENT — VISUAL ACUITY
OD_BCVA: 20/25
OS_BCVA: 20/30-2

## 2025-03-26 ASSESSMENT — LID EXAM ASSESSMENTS
OD_TRICHIASIS: RUL
OD_BLEPHARITIS: 1+
OS_BLEPHARITIS: 1+

## 2025-03-26 ASSESSMENT — KERATOMETRY
OD_K1POWER_DIOPTERS: 41.25
OS_K1POWER_DIOPTERS: 41.50
OS_K2POWER_DIOPTERS: 41.75
OD_K2POWER_DIOPTERS: 42.00
OD_AXISANGLE_DEGREES: 070
METHOD_AUTO_MANUAL: AUTO
OS_AXISANGLE_DEGREES: 090

## 2025-03-26 ASSESSMENT — TEAR BREAK UP TIME (TBUT)
OD_TBUT: 2 SEC
OS_TBUT: 2 SEC

## 2025-03-26 ASSESSMENT — CONFRONTATIONAL VISUAL FIELD TEST (CVF)
OS_FINDINGS: FULL
OD_FINDINGS: FULL

## 2025-03-26 ASSESSMENT — TONOMETRY
OS_IOP_MMHG: 15
OD_IOP_MMHG: 13

## 2025-03-26 ASSESSMENT — CORNEAL SURGICAL SCARRING: OD_SCARRING: STROMAL

## 2025-03-26 ASSESSMENT — SUPERFICIAL PUNCTATE KERATITIS (SPK)
OD_SPK: T
OS_SPK: T

## 2025-06-09 ENCOUNTER — RX RENEWAL (OUTPATIENT)
Age: 75
End: 2025-06-09

## 2025-06-23 ENCOUNTER — APPOINTMENT (OUTPATIENT)
Dept: CARDIOLOGY | Facility: CLINIC | Age: 75
End: 2025-06-23
Payer: MEDICARE

## 2025-06-23 ENCOUNTER — NON-APPOINTMENT (OUTPATIENT)
Age: 75
End: 2025-06-23

## 2025-06-23 VITALS
HEART RATE: 51 BPM | BODY MASS INDEX: 31.1 KG/M2 | SYSTOLIC BLOOD PRESSURE: 108 MMHG | OXYGEN SATURATION: 96 % | WEIGHT: 210 LBS | DIASTOLIC BLOOD PRESSURE: 72 MMHG | HEIGHT: 69 IN

## 2025-06-23 PROCEDURE — 99214 OFFICE O/P EST MOD 30 MIN: CPT | Mod: 25

## 2025-06-23 PROCEDURE — 93000 ELECTROCARDIOGRAM COMPLETE: CPT

## 2025-06-30 ENCOUNTER — APPOINTMENT (OUTPATIENT)
Dept: FAMILY MEDICINE | Facility: CLINIC | Age: 75
End: 2025-06-30
Payer: MEDICARE

## 2025-06-30 VITALS
WEIGHT: 206 LBS | HEIGHT: 69 IN | SYSTOLIC BLOOD PRESSURE: 122 MMHG | HEART RATE: 46 BPM | OXYGEN SATURATION: 97 % | DIASTOLIC BLOOD PRESSURE: 62 MMHG | BODY MASS INDEX: 30.51 KG/M2 | TEMPERATURE: 97.3 F

## 2025-06-30 PROCEDURE — 99213 OFFICE O/P EST LOW 20 MIN: CPT

## 2025-08-21 ENCOUNTER — APPOINTMENT (OUTPATIENT)
Dept: FAMILY MEDICINE | Facility: CLINIC | Age: 75
End: 2025-08-21
Payer: MEDICARE

## 2025-08-21 VITALS
BODY MASS INDEX: 30.36 KG/M2 | DIASTOLIC BLOOD PRESSURE: 74 MMHG | TEMPERATURE: 98 F | SYSTOLIC BLOOD PRESSURE: 120 MMHG | HEIGHT: 69 IN | WEIGHT: 205 LBS | OXYGEN SATURATION: 96 % | HEART RATE: 74 BPM

## 2025-08-21 DIAGNOSIS — R42 DIZZINESS AND GIDDINESS: ICD-10-CM

## 2025-08-21 DIAGNOSIS — Z20.822 CONTACT WITH AND (SUSPECTED) EXPOSURE TO COVID-19: ICD-10-CM

## 2025-08-21 DIAGNOSIS — G25.9 EXTRAPYRAMIDAL AND MOVEMENT DISORDER, UNSPECIFIED: ICD-10-CM

## 2025-08-21 PROCEDURE — 99214 OFFICE O/P EST MOD 30 MIN: CPT

## 2025-08-24 LAB
INFLUENZA A RESULT: NOT DETECTED
INFLUENZA B RESULT: NOT DETECTED
RESP SYN VIRUS RESULT: NOT DETECTED
SARS-COV-2 RESULT: DETECTED

## 2025-09-11 ENCOUNTER — RX RENEWAL (OUTPATIENT)
Age: 75
End: 2025-09-11